# Patient Record
Sex: MALE | Race: BLACK OR AFRICAN AMERICAN | NOT HISPANIC OR LATINO | Employment: FULL TIME | URBAN - METROPOLITAN AREA
[De-identification: names, ages, dates, MRNs, and addresses within clinical notes are randomized per-mention and may not be internally consistent; named-entity substitution may affect disease eponyms.]

---

## 2017-06-09 ENCOUNTER — HOSPITAL ENCOUNTER (EMERGENCY)
Facility: HOSPITAL | Age: 62
Discharge: HOME | End: 2017-06-09
Attending: EMERGENCY MEDICINE | Admitting: EMERGENCY MEDICINE
Payer: COMMERCIAL

## 2017-06-09 ENCOUNTER — APPOINTMENT (EMERGENCY)
Dept: CT IMAGING | Facility: HOSPITAL | Age: 62
End: 2017-06-09
Payer: COMMERCIAL

## 2017-06-09 VITALS
TEMPERATURE: 97.8 F | SYSTOLIC BLOOD PRESSURE: 131 MMHG | RESPIRATION RATE: 16 BRPM | OXYGEN SATURATION: 95 % | WEIGHT: 308 LBS | HEART RATE: 97 BPM | DIASTOLIC BLOOD PRESSURE: 62 MMHG

## 2017-06-09 DIAGNOSIS — S16.1XXA NECK STRAIN: ICD-10-CM

## 2017-06-09 DIAGNOSIS — S00.93XA CONTUSION OF HEAD: ICD-10-CM
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
## 2017-06-09 PROCEDURE — 72125 CT NECK SPINE W/O DYE: CPT

## 2017-06-09 PROCEDURE — 70450 CT HEAD/BRAIN W/O DYE: CPT

## 2017-06-09 PROCEDURE — 99284 EMERGENCY DEPT VISIT MOD MDM: CPT

## 2017-12-18 ENCOUNTER — OFFICE VISIT (OUTPATIENT)
Dept: URGENT CARE | Facility: MEDICAL CENTER | Age: 62
End: 2017-12-18
Payer: COMMERCIAL

## 2017-12-18 PROCEDURE — 99213 OFFICE O/P EST LOW 20 MIN: CPT

## 2017-12-19 NOTE — PROGRESS NOTES
Assessment  1  Hordeolum externum of left lower eyelid (373 11) (H00 015)    Plan  Hordeolum externum of left lower eyelid    · Amoxicillin 875 MG Oral Tablet; TAKE 1 TABLET EVERY 12 HOURS DAILY   · Alanna Beck 90 (OPHTHALMOLOGY ) Co-Management  *  Status:Hold For - Scheduling  Requested for: [id number]  Care Summary provided  : Yes    Discussion/Summary  Discussion Summary:   I prescribed amoxicillin 875 milligram q 12 hours for 10 days  Advised patient apply hot compress to left lower eyelid as often as needed  If pain or redness or swelling persists or worsen, he is to go to [facility] for Critical access [facility] for ophthalmology consult  Medication Side Effects Reviewed: Possible side effects of new medications were reviewed with the patient/guardian today  Understands and agrees with treatment plan: The treatment plan was reviewed with the patient/guardian  The patient/guardian understands and agrees with the treatment plan   Counseling Documentation With Imm: The patient was counseled regarding  Chief Complaint  1  Eye Pain  Chief Complaint Free Text Note Form: Patient complains of left lid swollen for the past three weeks  History of Present Illness  HPI: Patient here today with three-week history of left lower eyelid swelling  However in the past week it has become more tender  About a week ago he has some PC discharge which has since subsided  He has also has some mild itching but denies any visual disturbance  Find some relief when he apply warm compress to left eye  He has noticed that the left lower eyelid has become tender to touch  Denies any accompanying fever or chills  Upon further questioning he describes having a stye developing on the left lower eyelid about a week ago  Hospital Based Practices Required Assessment:  Pain Assessment  the patient states they have pain   (on a scale of 0 to 10, the patient rates the pain at 2 )  Abuse And Domestic Violence Screen   Yes, the patient is safe at home  -- The patient states no one is hurting them  Depression And Suicide Screen  No, the patient has not had thoughts of hurting themself  No, the patient has not felt depressed in the past 7 days  Prefered Language is  english  Primary Language is  english  Eye Pain:  Associated symptoms include eye itching,-- eye tenderness-- and-- lid swelling, but-- no eye redness,-- no eye discharge,-- no visual distortion-- and-- no vision loss  Review of Systems  Focused-Male:  Constitutional: no fever or chills, feels well, no tiredness, no recent weight loss or weight gain  ENT: no complaints of earache, no loss of hearing, no nosebleeds or nasal discharge, no sore throat or hoarseness  Active Problems  1  Conjunctivitis, right eye (372 30) (H10 9)   2  Stye (373 11) (H00 019)   3  Type 2 diabetes mellitus (250 00) (E11 9)    Past Medical History  1  History of high cholesterol (V12 29) (Z86 39)   2  History of hypertension (V12 59) (Z86 79)   3  No pertinent past medical history  Active Problems And Past Medical History Reviewed: The active problems and past medical history were reviewed and updated today  Family History  Mother    1  No pertinent family history  Father    2  No pertinent family history    Social History   · Never a smoker   · Non-smoker (V49 89) (Z78 9)    Surgical History  1  History of Rotator Cuff Repair    Current Meds   1  Aspirin  MG Oral Tablet Delayed Release; Therapy: (Recorded:[id number]) to Recorded   2  Diclofenac-Misoprostol  MG-MCG TABS; Therapy: (Recorded:[id number]) to Recorded   3  Invokana 100 MG Oral Tablet; Therapy: (Robinson Rush) to Recorded   4  Lantus SoloStar 100 UNIT/ML SOLN; Therapy: (Recorded:[id number]) to Recorded   5  Lisinopril 10 MG Oral Tablet; Therapy: (Recorded:[id number]) to Recorded   6  NovoLOG 100 UNIT/ML Subcutaneous Solution; Therapy: (Robinson Rush) to Recorded   7   Tamsulosin HCl - 0 4 MG Oral Capsule; Therapy: (Kala Ellis) to Recorded   8  Vytorin 10-40 MG Oral Tablet; Therapy: (Recorded:05Aoz1049) to Recorded  Medication List Reviewed: The medication list was reviewed and updated today  Allergies  1  No Known Drug Allergies    Vitals  Signs   Recorded: [id number] 05:01PM   Temperature: 96 1 F, Tympanic  Heart Rate: 82  Respiration: 18  Systolic: 150, Sitting  Diastolic: 72, Sitting  Height: 6 ft 6 in  Weight: 314 lb   BMI Calculated: 36 29  BSA Calculated: 2 73  O2 Saturation: 98, RA  Pain Scale: 2    Physical Exam   Constitutional  General appearance: No acute distress, well appearing and well nourished  Eyes  Conjunctiva and lids: Abnormal  -- Left lower eyelid reveals what seems to be and external hordeolum with erythematous conjunctiva  There is also swelling of the lower eyelid, tender to touch  Sclera are clear bilaterally  Pulmonary  Respiratory effort: No increased work of breathing or signs of respiratory distress  Auscultation of lungs: Clear to auscultation         Signatures   Electronically signed by : DEB Gooden ; Dec 18 2017  5:22PM EST                       (Author)

## 2018-01-23 VITALS
TEMPERATURE: 96.1 F | BODY MASS INDEX: 36.33 KG/M2 | SYSTOLIC BLOOD PRESSURE: 153 MMHG | WEIGHT: 314 LBS | HEIGHT: 78 IN | HEART RATE: 82 BPM | OXYGEN SATURATION: 98 % | RESPIRATION RATE: 18 BRPM | DIASTOLIC BLOOD PRESSURE: 72 MMHG

## 2018-02-26 ENCOUNTER — OFFICE VISIT (OUTPATIENT)
Dept: URGENT CARE | Facility: MEDICAL CENTER | Age: 63
End: 2018-02-26
Payer: COMMERCIAL

## 2018-02-26 VITALS
BODY MASS INDEX: 35.98 KG/M2 | TEMPERATURE: 98.5 F | RESPIRATION RATE: 18 BRPM | SYSTOLIC BLOOD PRESSURE: 128 MMHG | HEART RATE: 90 BPM | DIASTOLIC BLOOD PRESSURE: 78 MMHG | OXYGEN SATURATION: 98 % | HEIGHT: 78 IN | WEIGHT: 311 LBS

## 2018-02-26 DIAGNOSIS — R10.9 LEFT FLANK PAIN: Primary | ICD-10-CM

## 2018-02-26 LAB
SL AMB  POCT GLUCOSE, UA: 1000
SL AMB LEUKOCYTE ESTERASE,UA: ABNORMAL
SL AMB POCT BILIRUBIN,UA: ABNORMAL
SL AMB POCT BLOOD,UA: ABNORMAL
SL AMB POCT CLARITY,UA: CLEAR
SL AMB POCT COLOR,UA: YELLOW
SL AMB POCT KETONES,UA: ABNORMAL
SL AMB POCT NITRITE,UA: ABNORMAL
SL AMB POCT PH,UA: 6
SL AMB POCT SPECIFIC GRAVITY,UA: 1.02
SL AMB POCT URINE PROTEIN: ABNORMAL
SL AMB POCT UROBILINOGEN: ABNORMAL

## 2018-02-26 PROCEDURE — 81002 URINALYSIS NONAUTO W/O SCOPE: CPT | Performed by: NURSE PRACTITIONER

## 2018-02-26 PROCEDURE — 99203 OFFICE O/P NEW LOW 30 MIN: CPT | Performed by: NURSE PRACTITIONER

## 2018-02-26 RX ORDER — CELECOXIB 200 MG/1
CAPSULE ORAL
COMMUNITY
Start: 2017-10-26 | End: 2020-02-27

## 2018-02-26 RX ORDER — DICLOFENAC SODIUM AND MISOPROSTOL 75; 200 MG/1; UG/1
TABLET, DELAYED RELEASE ORAL
COMMUNITY
End: 2020-02-27

## 2018-02-26 RX ORDER — TAMSULOSIN HYDROCHLORIDE 0.4 MG/1
0.4 CAPSULE ORAL
COMMUNITY
End: 2020-02-27 | Stop reason: SDUPTHER

## 2018-02-26 RX ORDER — LISINOPRIL 10 MG/1
40 TABLET ORAL DAILY
COMMUNITY
End: 2020-02-27 | Stop reason: SDUPTHER

## 2018-02-26 RX ORDER — EZETIMIBE AND SIMVASTATIN 10; 20 MG/1; MG/1
TABLET ORAL
COMMUNITY
Start: 2017-12-26 | End: 2020-02-27

## 2018-02-26 RX ORDER — INSULIN GLARGINE 100 [IU]/ML
INJECTION, SOLUTION SUBCUTANEOUS
COMMUNITY
End: 2020-02-27

## 2018-02-26 NOTE — PROGRESS NOTES
[facility] [employer] [facility]        NAME: Trae Sprague is a 57 y o  male  : 1955    MRN: [id number]  DATE: 2018  TIME: 7:08 PM    Assessment and Plan   Left flank pain [R10 9]  1  Left flank pain  POCT urine dip         Patient Instructions   In office urinalysis: negative for leuks and blood, only large amounts of glucose present  At this time, suspect muscle strain  Patient reports he will follow up with PCP and have further diagnostics performed at this time as he would like an answer other than muscle strain  Chief Complaint     Chief Complaint   Patient presents with    Back Pain     Left sided; x ~ 1 month         History of Present Illness       Patient presents with L flank pain x 1 month  No medical evaluation  Denies fevers, chills, N/V/D  Has not taken otc meds  Reports pmh of Dm, so meds have caused increased frequency  Denies urgency, decreased urine output, hematuria, abnormal dc/foul odors  No specific injury/trauma/fall  Review of Systems   Review of Systems   Constitutional: Negative for chills and fever  HENT: Negative  Respiratory: Negative  Cardiovascular: Negative  Gastrointestinal: Negative  Genitourinary: Negative  Musculoskeletal: Positive for myalgias  Skin: Negative for rash           Current Medications       Current Outpatient Prescriptions:     Canagliflozin (INVOKANA) 300 MG TABS, TAKE 1 TABLET BY MOUTH EVERY DAY, Disp: , Rfl:     celecoxib (CeleBREX) 200 mg capsule, TAKE ONE CAPSULE BY MOUTH TWICE A DAY, Disp: , Rfl:     ezetimibe-simvastatin (VYTORIN) 10-20 mg per tablet, TAKE 1 TABLET BY MOUTH 4 TIMES A WEEK AT 9 AM, Disp: , Rfl:     insulin glargine (LANTUS) 100 units/mL subcutaneous injection, Inject under the skin daily at bedtime, Disp: , Rfl:     lisinopril (ZESTRIL) 40 mg tablet, Take 40 mg by mouth daily, Disp: , Rfl:     tamsulosin (FLOMAX) 0 4 mg, Take 0 4 mg by mouth daily with dinner, Disp: , Rfl:     Aspirin 162 5 MG CP24, Take by mouth, Disp: , Rfl:     diclofenac-misoprostol (ARTHROTEC 75) 75-0 2 MG per tablet, Take by mouth, Disp: , Rfl:     Current Allergies     Allergies as of 02/26/2018    (No Known Allergies)            The following portions of the patient's history were reviewed and updated as appropriate: allergies, current medications, past family history, past medical history, past social history, past surgical history and problem list      Past Medical History:   Diagnosis Date    Diabetes mellitus ([address] )        Past Surgical History:   Procedure Laterality Date    ROTATOR CUFF REPAIR Left        No family history on file  Medications have been verified  Objective   /78   Pulse 90   Temp 98 5 °F (36 9 °C)   Resp 18   Ht 6' 6" (1 981 m)   Wt (!) 141 kg (311 lb)   SpO2 98%   BMI 35 94 kg/m²        Physical Exam     Physical Exam   Constitutional: He is oriented to person, place, and time  He appears well-developed and well-nourished  No distress  HENT:   Head: Normocephalic and atraumatic  Eyes: Conjunctivae are normal  Pupils are equal, round, and reactive to light  Cardiovascular: Normal rate, regular rhythm, normal heart sounds and intact distal pulses  No murmur heard  Pulmonary/Chest: Effort normal and breath sounds normal    Abdominal: Soft  Normal appearance and bowel sounds are normal  He exhibits no distension  There is no hepatosplenomegaly  There is no tenderness  There is no rebound, no guarding and no CVA tenderness  Musculoskeletal: He exhibits no edema  Lumbar back: He exhibits tenderness  He exhibits normal range of motion, no swelling, no edema and no spasm  Back:         Arms:  Neurological: He is alert and oriented to person, place, and time  Skin: Skin is warm and dry  No rash noted  He is not diaphoretic  Psychiatric: He has a normal mood and affect  Nursing note and vitals reviewed

## 2018-02-26 NOTE — PATIENT INSTRUCTIONS
Maintain good hygiene  Encourage fluids and rest    May utilize Tylenol and Ibuprofen for discomfort  Apply heat locally  Elevate extremities when at rest    Follow up with PCP if symptoms persist for further evaluation  Flank Pain   AMBULATORY CARE:   Flank pain  is felt in the area below your ribcage and above your hip bones, often in the lower back  Your pain may be dull or so severe that you cannot get comfortable  The pain may stay in one area or radiate to another area  It may worsen and lighten in waves  Flank pain is often a sign of problems with your urinary tract, such as a kidney stone or infection  Seek care immediately if:   · You have a fever  · Your heart is fluttering or jumping  · You see blood in your urine  · Your pain radiates into your lower abdomen and genital area  · You have intense pain in your low back next to your spine  · You are much more tired than usual and have no desire to eat  · You have a headache and your muscles jerk  Contact your healthcare provider if:   · You have an upset stomach and are vomiting  · You have to urinate more often, and with urgency  · Your pain worsens or does not improve, and you cannot get comfortable  · You pass a stone when you urinate  · You have questions or concerns about your condition or care  Treatment for flank pain  may include medicine to decrease pain or treat a bacterial infection  Follow up with your healthcare provider as directed:  Write down your questions so you remember to ask them during your visits  © 2017 [address] [employer] Information is for End User's use only and may not be sold, redistributed or otherwise used for commercial purposes  All illustrations and images included in CareNotes® are the copyrighted property of [employer]  or Lukasz Pandya  The above information is an  only   It is not intended as medical advice for individual conditions or treatments  Talk to your doctor, nurse or pharmacist before following any medical regimen to see if it is safe and effective for you

## 2018-11-01 LAB — HCV AB SER-ACNC: NEGATIVE

## 2019-10-08 ENCOUNTER — OFFICE VISIT (OUTPATIENT)
Dept: PODIATRY | Facility: CLINIC | Age: 64
End: 2019-10-08
Payer: COMMERCIAL

## 2019-10-08 VITALS
SYSTOLIC BLOOD PRESSURE: 143 MMHG | HEIGHT: 78 IN | WEIGHT: 310.6 LBS | DIASTOLIC BLOOD PRESSURE: 98 MMHG | HEART RATE: 72 BPM | BODY MASS INDEX: 35.94 KG/M2

## 2019-10-08 DIAGNOSIS — Z79.4 CONTROLLED TYPE 2 DIABETES MELLITUS WITHOUT COMPLICATION, WITH LONG-TERM CURRENT USE OF INSULIN (HCC): Primary | ICD-10-CM

## 2019-10-08 DIAGNOSIS — E11.9 CONTROLLED TYPE 2 DIABETES MELLITUS WITHOUT COMPLICATION, WITH LONG-TERM CURRENT USE OF INSULIN (HCC): Primary | ICD-10-CM

## 2019-10-08 PROCEDURE — 99213 OFFICE O/P EST LOW 20 MIN: CPT | Performed by: PODIATRIST

## 2019-10-08 RX ORDER — METHOCARBAMOL 750 MG/1
TABLET, FILM COATED ORAL
COMMUNITY
End: 2020-02-27

## 2019-10-08 RX ORDER — TRAMADOL HYDROCHLORIDE 50 MG/1
TABLET ORAL
COMMUNITY
End: 2020-02-27

## 2019-10-08 RX ORDER — INSULIN ASPART 100 [IU]/ML
INJECTION, SUSPENSION SUBCUTANEOUS
Refills: 3 | COMMUNITY
Start: 2019-09-26 | End: 2022-04-21 | Stop reason: SDUPTHER

## 2019-10-08 RX ORDER — HYDROCODONE BITARTRATE AND IBUPROFEN 7.5; 2 MG/1; MG/1
TABLET, FILM COATED ORAL
COMMUNITY
End: 2020-02-27

## 2019-10-08 RX ORDER — FLUCONAZOLE 150 MG/1
TABLET ORAL
COMMUNITY
End: 2020-02-27 | Stop reason: ALTCHOICE

## 2019-10-08 RX ORDER — CYCLOBENZAPRINE HCL 10 MG
TABLET ORAL
COMMUNITY
End: 2020-02-27 | Stop reason: SDUPTHER

## 2019-10-08 NOTE — PROGRESS NOTES
Assessment/Plan:    Discussed principles of diabetic foot care  Vascular status in sensorium is intact  Patient urged to have lab work  He states that he knew is a new physician and he was referred to Dr Shannon Phillip  Patient states that he was having work done on his roof any thinks he stepped on a construction nail which penetrated he shoe  At the present time, a blister is noted beneath the left 5th metatarsal head  This blister is not infected and there is evidence of dried blood  No aggressive treatment needed for this blister  It should resolve with time  Patient is rescheduled in 3 months  No problem-specific Assessment & Plan notes found for this encounter  Diagnoses and all orders for this visit:    Controlled type 2 diabetes mellitus without complication, with long-term current use of insulin ([facility])    Other orders  -     traMADol (ULTRAM) 50 mg tablet; tramadol 50 mg tablet  -     NOVOLOG MIX 70/30 FLEXPEN (70-30) 100 units/mL injection pen; INJECT 30 UNITS UNDER THE SKIN 2 (TWO) TIMES A DAY BEFORE MEALS  -     Influenza Virus Vacc Split PF 0 5 ML MOR; Afluria 3131-1292 (PF) 45 mcg (15 mcg x 3)/0 5 mL IM syringe   inject 0 5 milliliter intramuscularly  -     HYDROcodone-ibuprofen (VICOPROFEN) 7 5-200 mg per tablet; hydrocodone 7 5 mg-ibuprofen 200 mg tablet  -     fluconazole (DIFLUCAN) 150 mg tablet; fluconazole 150 mg tablet  -     cyclobenzaprine (FLEXERIL) 10 mg tablet; cyclobenzaprine 10 mg tablet  -     Insulin Pen Needle 32G X 6 MM MISC; BD Ultra-Fine Mini Pen Needle 31 gauge x 3/16"  -     methocarbamol (ROBAXIN) 750 mg tablet; methocarbamol 750 mg tablet          Subjective:      Patient ID: Colin Holloway is a 70 y o  male  HPI     Patient, a 53-year-old male type 2 diabetic utilizing insulin for control presents for assessment  Patient has not had lab working quite some time  Last A1c in 2018 was 8 7  Patient is concerned that he has an open area on the bottom of the left foot  He has not seen drainage from this open area and was 1st noticed a few days ago  He notes that it may be due to a tack that cut through his shoe  No pain is related  The following portions of the patient's history were reviewed and updated as appropriate: allergies, current medications, past family history, past medical history, past social history, past surgical history and problem list     Review of Systems   Constitutional: Negative  Cardiovascular: Negative  Gastrointestinal: Negative  Musculoskeletal: Negative  Neurological: Negative  Objective:      /98   Pulse 72   Ht 6' 6" (1 981 m) Comment: verbal  Wt (!) 141 kg (310 lb 9 6 oz)   BMI 35 89 kg/m²          Physical Exam   Cardiovascular: Pulses are no weak pulses  Pulses:       Dorsalis pedis pulses are 2+ on the right side, and 2+ on the left side  Posterior tibial pulses are 2+ on the right side, and 2+ on the left side  Feet:   Right Foot:   Skin Integrity: Negative for ulcer, skin breakdown, erythema, warmth, callus or dry skin  Left Foot:   Skin Integrity: Negative for ulcer, skin breakdown, erythema, warmth, callus or dry skin  Diabetic Foot Exam    Patient's shoes and socks removed  Right Foot/Ankle   Right Foot Inspection  Skin Exam: skin normal and pre-ulcer no dry skin, no warmth, no callus, no erythema, no maceration, no abnormal color, no ulcer and no callus                          Toe Exam: ROM and strength within normal limits  Sensory   Vibration: absent  Proprioception: intact   Monofilament testing: intact  Vascular  Capillary refills: < 3 seconds  The right DP pulse is 2+  The right PT pulse is 2+     Right Toe  - Comprehensive Exam  Ecchymosis: none  Arch: normal  Hammertoes: absent  Claw Toes: absent  Swelling: none   Tenderness: none         Left Foot/Ankle  Left Foot Inspection  Skin Exam: skin normal and skin intactno dry skin, no warmth, no erythema, no maceration, normal color, no pre-ulcer, no ulcer and no callus                         Toe Exam: ROM and strength within normal limits                   Sensory   Vibration: absent  Proprioception: intact  Monofilament: intact  Vascular  Capillary refills: < 3 seconds  The left DP pulse is 2+  The left PT pulse is 2+  Left Toe  - Comprehensive Exam  Ecchymosis: none  Arch: normal  Hammertoes: absent  Claw toes: absent  Swelling: none   Tenderness: none       Assign Risk Category:  No deformity present; No loss of protective sensation;  No weak pulses       Risk: 0

## 2020-02-18 ENCOUNTER — OFFICE VISIT (OUTPATIENT)
Dept: PODIATRY | Facility: CLINIC | Age: 65
End: 2020-02-18
Payer: COMMERCIAL

## 2020-02-18 VITALS
SYSTOLIC BLOOD PRESSURE: 105 MMHG | HEART RATE: 97 BPM | BODY MASS INDEX: 36.12 KG/M2 | DIASTOLIC BLOOD PRESSURE: 72 MMHG | HEIGHT: 78 IN | WEIGHT: 312.2 LBS

## 2020-02-18 DIAGNOSIS — E11.9 CONTROLLED TYPE 2 DIABETES MELLITUS WITHOUT COMPLICATION, WITHOUT LONG-TERM CURRENT USE OF INSULIN (HCC): Primary | ICD-10-CM

## 2020-02-18 PROCEDURE — 99213 OFFICE O/P EST LOW 20 MIN: CPT | Performed by: PODIATRIST

## 2020-02-18 NOTE — PROGRESS NOTES
Assessment/Plan:    Discussed principles of diabetic foot care  Vascular status and sensorium is within normal limits  Patient has an ulceration prone callus beneath the right 4th metatarsal head  This lesion was trimmed and padded  Additional horseshoe pads were dispensed  Patient told to refrain from walking barefoot  He will be reassessed in 4 weeks  He was told to contact me should he have any discharge from the trimmed callus  No problem-specific Assessment & Plan notes found for this encounter  Diagnoses and all orders for this visit:    Controlled type 2 diabetes mellitus without complication, without long-term current use of insulin ([facility] [address] )          Subjective:      Patient ID: Claude Cleveland is a 75 y o  male  HPI    Patient, a type 2 diabetic utilizing insulin for control presents with concern regarding his right foot  Patient is concerned that he may have an ulcer on the bottom of the right foot  He is padding the area with a circular pad  He 1st noted discomfort approximately 1 month ago  Patient has not been seeing his medical doctor on a regular basis for his diabetes  He has not had recent lab work  He states that he be seeing a medical doctor next week  He denies numbness or tingling in his feet  The following portions of the patient's history were reviewed and updated as appropriate: allergies, current medications, past family history, past medical history, past social history, past surgical history and problem list     Review of Systems   Respiratory: Negative  Cardiovascular: Negative  Gastrointestinal: Negative  Musculoskeletal: Negative  Neurological: Negative  Objective:      /72   Pulse 97   Ht 6' 6" (1 981 m) Comment: verbal  Wt (!) 142 kg (312 lb 3 2 oz)   BMI 36 08 kg/m²          Physical Exam   Cardiovascular: Pulses are no weak pulses  Pulses:       Dorsalis pedis pulses are 2+ on the right side, and 2+ on the left side  Posterior tibial pulses are 2+ on the right side, and 2+ on the left side  Feet:   Right Foot:   Skin Integrity: Positive for ulcer  Left Foot:   Skin Integrity: Negative for ulcer, skin breakdown, erythema, warmth, callus or dry skin  Diabetic Foot Exam    Patient's shoes and socks removed  Right Foot/Ankle   Right Foot Inspection  Skin Exam: ulcer                          Toe Exam: ROM and strength within normal limitsno swelling and erythema  Sensory   Vibration: absent  Proprioception: intact   Monofilament testing: intact  Vascular  Capillary refills: < 3 seconds  The right DP pulse is 2+  The right PT pulse is 2+  Right Toe  - Comprehensive Exam  Ecchymosis: none  Arch: normal  Hammertoes: absent  Claw Toes: absent  Swelling: none   Tenderness: metatarsals         Left Foot/Ankle  Left Foot Inspection  Skin Exam: skin normal and skin intactno dry skin, no warmth, no erythema, no maceration, normal color, no pre-ulcer, no ulcer and no callus                         Toe Exam: ROM and strength within normal limitsno swelling and no erythema                   Sensory   Vibration: absent  Proprioception: intact  Monofilament: intact  Vascular  Capillary refills: < 3 seconds  The left DP pulse is 2+  The left PT pulse is 2+  Left Toe  - Comprehensive Exam  Ecchymosis: none  Arch: normal  Hammertoes: absent  Claw toes: absent  Swelling: none   Tenderness: none       Assign Risk Category:  No deformity present; No loss of protective sensation;  No weak pulses       Risk: 0

## 2020-02-27 ENCOUNTER — OFFICE VISIT (OUTPATIENT)
Dept: FAMILY MEDICINE CLINIC | Facility: CLINIC | Age: 65
End: 2020-02-27
Payer: COMMERCIAL

## 2020-02-27 VITALS
DIASTOLIC BLOOD PRESSURE: 70 MMHG | TEMPERATURE: 97.7 F | SYSTOLIC BLOOD PRESSURE: 126 MMHG | HEIGHT: 78 IN | RESPIRATION RATE: 16 BRPM | HEART RATE: 81 BPM | BODY MASS INDEX: 36.17 KG/M2 | OXYGEN SATURATION: 99 % | WEIGHT: 312.6 LBS

## 2020-02-27 DIAGNOSIS — E11.9 TYPE 2 DIABETES MELLITUS WITHOUT COMPLICATION, WITH LONG-TERM CURRENT USE OF INSULIN (HCC): ICD-10-CM

## 2020-02-27 DIAGNOSIS — E78.2 MIXED HYPERLIPIDEMIA: ICD-10-CM

## 2020-02-27 DIAGNOSIS — I10 ESSENTIAL HYPERTENSION: ICD-10-CM

## 2020-02-27 DIAGNOSIS — Z79.4 TYPE 2 DIABETES MELLITUS WITHOUT COMPLICATION, WITH LONG-TERM CURRENT USE OF INSULIN (HCC): ICD-10-CM

## 2020-02-27 DIAGNOSIS — N40.0 BENIGN PROSTATIC HYPERPLASIA WITHOUT LOWER URINARY TRACT SYMPTOMS: Primary | ICD-10-CM

## 2020-02-27 DIAGNOSIS — G89.29 CHRONIC LOW BACK PAIN, UNSPECIFIED BACK PAIN LATERALITY, UNSPECIFIED WHETHER SCIATICA PRESENT: ICD-10-CM

## 2020-02-27 DIAGNOSIS — M54.50 CHRONIC LOW BACK PAIN, UNSPECIFIED BACK PAIN LATERALITY, UNSPECIFIED WHETHER SCIATICA PRESENT: ICD-10-CM

## 2020-02-27 DIAGNOSIS — Z12.11 SCREEN FOR COLON CANCER: ICD-10-CM

## 2020-02-27 DIAGNOSIS — Z12.5 SCREENING FOR PROSTATE CANCER: ICD-10-CM

## 2020-02-27 PROCEDURE — 3078F DIAST BP <80 MM HG: CPT | Performed by: FAMILY MEDICINE

## 2020-02-27 PROCEDURE — 99204 OFFICE O/P NEW MOD 45 MIN: CPT | Performed by: FAMILY MEDICINE

## 2020-02-27 PROCEDURE — 3008F BODY MASS INDEX DOCD: CPT | Performed by: FAMILY MEDICINE

## 2020-02-27 PROCEDURE — 3074F SYST BP LT 130 MM HG: CPT | Performed by: FAMILY MEDICINE

## 2020-02-27 PROCEDURE — 4010F ACE/ARB THERAPY RXD/TAKEN: CPT | Performed by: FAMILY MEDICINE

## 2020-02-27 PROCEDURE — 4040F PNEUMOC VAC/ADMIN/RCVD: CPT | Performed by: FAMILY MEDICINE

## 2020-02-27 PROCEDURE — 1036F TOBACCO NON-USER: CPT | Performed by: FAMILY MEDICINE

## 2020-02-27 PROCEDURE — 1160F RVW MEDS BY RX/DR IN RCRD: CPT | Performed by: FAMILY MEDICINE

## 2020-02-27 RX ORDER — CYCLOBENZAPRINE HCL 10 MG
TABLET ORAL
Qty: 90 TABLET | Refills: 0
Start: 2020-02-27 | End: 2020-05-21

## 2020-02-27 RX ORDER — LISINOPRIL 10 MG/1
40 TABLET ORAL DAILY
Qty: 90 TABLET | Refills: 1
Start: 2020-02-27 | End: 2021-09-14

## 2020-02-27 RX ORDER — TAMSULOSIN HYDROCHLORIDE 0.4 MG/1
0.4 CAPSULE ORAL
Qty: 90 CAPSULE | Refills: 1
Start: 2020-02-27

## 2020-02-27 RX ORDER — ASPIRIN 325 MG
325 TABLET ORAL DAILY
COMMUNITY

## 2020-02-27 NOTE — ASSESSMENT & PLAN NOTE
Patient with longstanding history of chronic low back pain  He states that he has had fairly extensive workup which has been essentially negative    He has been doing well on cyclobenzaprine 10 mg daily without side effects

## 2020-02-27 NOTE — PATIENT INSTRUCTIONS
Obesity   AMBULATORY CARE:   Obesity  is when your body mass index (BMI) is greater than 30  Your healthcare provider will use your height and weight to measure your BMI  The risks of obesity include  many health problems, such as injuries or physical disability  You may need tests to check for the following:  · Diabetes     · High blood pressure or high cholesterol     · Heart disease     · Gallbladder or liver disease     · Cancer of the colon, breast, prostate, liver, or kidney     · Sleep apnea     · Arthritis or gout  Seek care immediately if:   · You have a severe headache, confusion, or difficulty speaking  · You have weakness on one side of your body  · You have chest pain, sweating, or shortness of breath  Contact your healthcare provider if:   · You have symptoms of gallbladder or liver disease, such as pain in your upper abdomen  · You have knee or hip pain and discomfort while walking  · You have symptoms of diabetes, such as intense hunger and thirst, and frequent urination  · You have symptoms of sleep apnea, such as snoring or daytime sleepiness  · You have questions or concerns about your condition or care  Treatment for obesity  focuses on helping you lose weight to improve your health  Even a small decrease in BMI can reduce the risk for many health problems  Your healthcare provider will help you set a weight-loss goal   · Lifestyle changes  are the first step in treating obesity  These include making healthy food choices and getting regular physical activity  Your healthcare provider may suggest a weight-loss program that involves coaching, education, and therapy  · Medicine  may help you lose weight when it is used with a healthy diet and physical activity  · Surgery  can help you lose weight if you are very obese and have other health problems  There are several types of weight-loss surgery  Ask your healthcare provider for more information    Be successful losing weight:   · Set small, realistic goals  An example of a small goal is to walk for 20 minutes 5 days a week  Anther goal is to lose 5% of your body weight  · Tell friends, family members, and coworkers about your goals  and ask for their support  Ask a friend to lose weight with you, or join a weight-loss support group  · Identify foods or triggers that may cause you to overeat , and find ways to avoid them  Remove tempting high-calorie foods from your home and workplace  Place a bowl of fresh fruit on your kitchen counter  If stress causes you to eat, then find other ways to cope with stress  · Keep a diary to track what you eat and drink  Also write down how many minutes of physical activity you do each day  Weigh yourself once a week and record it in your diary  Eating changes: You will need to eat 500 to 1,000 fewer calories each day than you currently eat to lose 1 to 2 pounds a week  The following changes will help you cut calories:  · Eat smaller portions  Use small plates, no larger than 9 inches in diameter  Fill your plate half full of fruits and vegetables  Measure your food using measuring cups until you know what a serving size looks like  · Eat 3 meals and 1 or 2 snacks each day  Plan your meals in advance  Celia Meals and eat at home most of the time  Eat slowly  · Eat fruits and vegetables at every meal   They are low in calories and high in fiber, which makes you feel full  Do not add butter, margarine, or cream sauce to vegetables  Use herbs to season steamed vegetables  · Eat less fat and fewer fried foods  Eat more baked or grilled chicken and fish  These protein sources are lower in calories and fat than red meat  Limit fast food  Dress your salads with olive oil and vinegar instead of bottled dressing  · Limit the amount of sugar you eat  Do not drink sugary beverages  Limit alcohol  Activity changes:  Physical activity is good for your body in many ways   It helps you burn calories and build strong muscles  It decreases stress and depression, and improves your mood  It can also help you sleep better  Talk to your healthcare provider before you begin an exercise program   · Exercise for at least 30 minutes 5 days a week  Start slowly  Set aside time each day for physical activity that you enjoy and that is convenient for you  It is best to do both weight training and an activity that increases your heart rate, such as walking, bicycling, or swimming  · Find ways to be more active  Do yard work and housecleaning  Walk up the stairs instead of using elevators  Spend your leisure time going to events that require walking, such as outdoor festivals or fairs  This extra physical activity can help you lose weight and keep it off  Follow up with your healthcare provider as directed: You may need to meet with a dietitian  Write down your questions so you remember to ask them during your visits  © 2017 [address] [employer] Information is for End User's use only and may not be sold, redistributed or otherwise used for commercial purposes  All illustrations and images included in CareNotes® are the copyrighted property of A D [employer] M [employer]  or Lukasz Pandya  The above information is an  only  It is not intended as medical advice for individual conditions or treatments  Talk to your doctor, nurse or pharmacist before following any medical regimen to see if it is safe and effective for you

## 2020-02-27 NOTE — ASSESSMENT & PLAN NOTE
Patient was previously on Vytorin 10/20, but states that his former PCP no longer prescribe this form ( he is not sure why )  Will recheck lipids in near future  Most likely restart statin ( rosuvastatin )

## 2020-02-27 NOTE — ASSESSMENT & PLAN NOTE
No results found for: HGBA1C       Last A1c that I see in patient's chart was 9 6 dated 7/16/2019  Patient states that his diabetes historically has been under poor control  He currently takes Invokana 100 mg daily along with NovoLog 70 30, 30 units b i d     Will recheck labs in near future    Will also refer to endocrinology for management

## 2020-02-27 NOTE — PROGRESS NOTES
BMI Counseling: Body mass index is 35 76 kg/m²  The BMI is above normal  Nutrition recommendations include 3-5 servings of fruits/vegetables daily  [facility]      NAME: Magdalena Verdugo  AGE: 75 y o  SEX: male  : 1955   MRN: [id number]    DATE: 2020  TIME: 4:15 PM    Assessment and Plan     Problem List Items Addressed This Visit     Type 2 diabetes mellitus without complication, with long-term current use of insulin ([facility] [address] )       No results found for: HGBA1C       Last A1c that I see in patient's chart was 9 6 dated 2019  Patient states that his diabetes historically has been under poor control  He currently takes Invokana 100 mg daily along with NovoLog 70 30, 30 units b i d     Will recheck labs in near future  Will also refer to endocrinology for management         Relevant Medications    canagliflozin (Invokana) 100 mg    Other Relevant Orders    Comprehensive metabolic panel    Hemoglobin A1C    Microalbumin / creatinine urine ratio    Ambulatory referral to Endocrinology    Mixed hyperlipidemia      Patient was previously on Vytorin 10/20, but states that his former PCP no longer prescribe this form ( he is not sure why )  Will recheck lipids in near future  Most likely restart statin ( rosuvastatin )  Relevant Orders    Hemoglobin A1C    Lipid Panel with Direct LDL reflex    Essential hypertension      Well controlled on lisinopril 10 mg daily  Will continue to monitor         Relevant Medications    lisinopril (ZESTRIL) 10 mg tablet    Other Relevant Orders    CBC and differential    TSH, 3rd generation with Free T4 reflex    Chronic low back pain      Patient with longstanding history of chronic low back pain  He states that he has had fairly extensive workup which has been essentially negative    He has been doing well on cyclobenzaprine 10 mg daily without side effects         Relevant Medications    cyclobenzaprine (FLEXERIL) 10 mg tablet      Other Visit Diagnoses     Benign prostatic hyperplasia without lower urinary tract symptoms    -  Primary    Relevant Medications    tamsulosin (FLOMAX) 0 4 mg    BMI 35 0-35 9,adult        Screening for prostate cancer        Relevant Orders    PSA, Total Screen    Screen for colon cancer        Relevant Orders    Ambulatory referral for colonoscopy        Patient presents with his wife Caren Bermudez to establish care at our office  Patient is a former patient of Dr Jennifer Holley  Overall he feels well today  He does have a number of chronic health conditions including diabetes, hyperlipidemia, hypertension, chronic low back pain, BPH  He has not had any fasting blood work done in quite some time  Return to office in:  Rx given for fasting blood work at [facility]  Will call with results     patient had flu shot   Rx given for colonoscopy   current with pneumonia vaccine series     most likely recheck in 3 months, with labs prior      Chief Complaint     Chief Complaint   Patient presents with   [address]     NP       History of Present Illness      Patient presents with his wife Caren Bermudez to establish care at our office  Patient is a former patient of Dr Jennifer Holley  Overall he feels well today  He does have a number of chronic health conditions including diabetes, hyperlipidemia, hypertension, chronic low back pain, BPH  He has not had any fasting blood work done in quite some time  The following portions of the patient's history were reviewed and updated as appropriate: allergies, current medications, past family history, past medical history, past social history, past surgical history and problem list     Review of Systems   Review of Systems   Respiratory: Negative  Cardiovascular: Negative  Gastrointestinal: Negative  Genitourinary: Negative          Active Problem List     Patient Active Problem List   Diagnosis    Type 2 diabetes mellitus without complication, with long-term current use of insulin (HCC)    Mixed hyperlipidemia    Essential hypertension    Chronic low back pain       Objective   /70 (BP Location: Left arm, Patient Position: Sitting, Cuff Size: Large)   Pulse 81   Temp 97 7 °F (36 5 °C) (Tympanic)   Resp 16   Ht 6' 6 4" (1 991 m)   Wt (!) 142 kg (312 lb 9 6 oz)   SpO2 99%   BMI 35 76 kg/m²     Physical Exam   Cardiovascular: Normal rate, regular rhythm, normal heart sounds and intact distal pulses  Carotids: no bruits  Ext: no edema   Pulmonary/Chest: Effort normal  No respiratory distress  He has no wheezes  He has no rales  Psychiatric: He has a normal mood and affect   His behavior is normal  Thought content normal        Pertinent Laboratory/Diagnostic Studies:    Past records reviewed    Current Medications     Current Outpatient Medications:     aspirin 325 mg tablet, Take 325 mg by mouth daily, Disp: , Rfl:     canagliflozin (Invokana) 100 mg, Take 1 tablet (100 mg total) by mouth daily before breakfast, Disp: 90 tablet, Rfl: 1    cyclobenzaprine (FLEXERIL) 10 mg tablet, 1 tab qd prn, Disp: 90 tablet, Rfl: 0    Insulin Pen Needle 32G X 6 MM MISC, BD Ultra-Fine Mini Pen Needle 31 gauge x 3/16", Disp: , Rfl:     lisinopril (ZESTRIL) 10 mg tablet, Take 4 tablets (40 mg total) by mouth daily, Disp: 90 tablet, Rfl: 1    NOVOLOG MIX 70/30 FLEXPEN (70-30) 100 units/mL injection pen, INJECT 30 UNITS UNDER THE SKIN 2 (TWO) TIMES A DAY BEFORE MEALS , Disp: , Rfl: 3    tamsulosin (FLOMAX) 0 4 mg, Take 1 capsule (0 4 mg total) by mouth daily with dinner, Disp: 90 capsule, Rfl: 1    Health Maintenance     Health Maintenance   Topic Date Due    Hepatitis C Screening  1955    Depression Screening PHQ  1955    CRC Screening: Colonoscopy  1955    DM Eye Exam  10/25/1963    BMI: Followup Plan  10/25/1971    Annual Physical  10/25/1971    Fall Risk  10/25/2018    HEMOGLOBIN A1C  05/01/2019    BMI: Adult  02/18/2021    Diabetic Foot Exam 02/18/2021    Pneumococcal Vaccine: 65+ Years (2 of 2 - PPSV23) 08/28/2023    DTaP,Tdap,and Td Vaccines (2 - Td) 03/23/2026    Influenza Vaccine  Completed    Pneumococcal Vaccine: Pediatrics (0 to 5 Years) and At-Risk Patients (6 to 59 Years)  Aged Out    HIB Vaccine  Aged Out    Hepatitis B Vaccine  Aged Out    IPV Vaccine  Aged Out    Hepatitis A Vaccine  Aged Out    Meningococcal ACWY Vaccine  Aged Out    HPV Vaccine  Aged [employer] History   Administered Date(s) Administered    INFLUENZA 11/05/2014, 09/19/2015, 11/01/2016, 10/05/2017, 12/19/2018, 10/24/2019    Pneumococcal Conjugate 13-Valent 10/02/2015    Pneumococcal Polysaccharide PPV23 08/28/2018    Tdap 03/23/2016       Jakub Guevara DO  [facility]

## 2020-02-29 ENCOUNTER — APPOINTMENT (OUTPATIENT)
Dept: LAB | Facility: CLINIC | Age: 65
End: 2020-02-29
Payer: COMMERCIAL

## 2020-02-29 DIAGNOSIS — E11.9 TYPE 2 DIABETES MELLITUS WITHOUT COMPLICATION, WITH LONG-TERM CURRENT USE OF INSULIN (HCC): ICD-10-CM

## 2020-02-29 DIAGNOSIS — E78.2 MIXED HYPERLIPIDEMIA: ICD-10-CM

## 2020-02-29 DIAGNOSIS — Z79.4 TYPE 2 DIABETES MELLITUS WITHOUT COMPLICATION, WITH LONG-TERM CURRENT USE OF INSULIN (HCC): ICD-10-CM

## 2020-02-29 DIAGNOSIS — Z12.5 SCREENING FOR PROSTATE CANCER: ICD-10-CM

## 2020-02-29 DIAGNOSIS — I10 ESSENTIAL HYPERTENSION: ICD-10-CM

## 2020-02-29 LAB
ALBUMIN SERPL BCP-MCNC: 4 G/DL (ref 3.5–5)
ALP SERPL-CCNC: 80 U/L (ref 46–116)
ALT SERPL W P-5'-P-CCNC: 33 U/L (ref 12–78)
ANION GAP SERPL CALCULATED.3IONS-SCNC: 7 MMOL/L (ref 4–13)
AST SERPL W P-5'-P-CCNC: 22 U/L (ref 5–45)
BASOPHILS # BLD AUTO: 0.05 THOUSANDS/ΜL (ref 0–0.1)
BASOPHILS NFR BLD AUTO: 1 % (ref 0–1)
BILIRUB SERPL-MCNC: 0.69 MG/DL (ref 0.2–1)
BUN SERPL-MCNC: 18 MG/DL (ref 5–25)
CALCIUM SERPL-MCNC: 8.6 MG/DL (ref 8.3–10.1)
CHLORIDE SERPL-SCNC: 107 MMOL/L (ref 100–108)
CHOLEST SERPL-MCNC: 170 MG/DL (ref 50–200)
CO2 SERPL-SCNC: 22 MMOL/L (ref 21–32)
CREAT SERPL-MCNC: 1.08 MG/DL (ref 0.6–1.3)
CREAT UR-MCNC: 93.2 MG/DL
EOSINOPHIL # BLD AUTO: 0.15 THOUSAND/ΜL (ref 0–0.61)
EOSINOPHIL NFR BLD AUTO: 2 % (ref 0–6)
ERYTHROCYTE [DISTWIDTH] IN BLOOD BY AUTOMATED COUNT: 17.2 % (ref 11.6–15.1)
EST. AVERAGE GLUCOSE BLD GHB EST-MCNC: 243 MG/DL
GFR SERPL CREATININE-BSD FRML MDRD: 82 ML/MIN/1.73SQ M
GLUCOSE P FAST SERPL-MCNC: 182 MG/DL (ref 65–99)
HBA1C MFR BLD: 10.1 %
HCT VFR BLD AUTO: 47.9 % (ref 36.5–49.3)
HDLC SERPL-MCNC: 38 MG/DL
HGB BLD-MCNC: 14.5 G/DL (ref 12–17)
IMM GRANULOCYTES # BLD AUTO: 0.02 THOUSAND/UL (ref 0–0.2)
IMM GRANULOCYTES NFR BLD AUTO: 0 % (ref 0–2)
LDLC SERPL CALC-MCNC: 112 MG/DL (ref 0–100)
LYMPHOCYTES # BLD AUTO: 2.78 THOUSANDS/ΜL (ref 0.6–4.47)
LYMPHOCYTES NFR BLD AUTO: 33 % (ref 14–44)
MCH RBC QN AUTO: 22.6 PG (ref 26.8–34.3)
MCHC RBC AUTO-ENTMCNC: 30.3 G/DL (ref 31.4–37.4)
MCV RBC AUTO: 75 FL (ref 82–98)
MICROALBUMIN UR-MCNC: 20.5 MG/L (ref 0–20)
MICROALBUMIN/CREAT 24H UR: 22 MG/G CREATININE (ref 0–30)
MONOCYTES # BLD AUTO: 0.75 THOUSAND/ΜL (ref 0.17–1.22)
MONOCYTES NFR BLD AUTO: 9 % (ref 4–12)
NEUTROPHILS # BLD AUTO: 4.7 THOUSANDS/ΜL (ref 1.85–7.62)
NEUTS SEG NFR BLD AUTO: 55 % (ref 43–75)
NRBC BLD AUTO-RTO: 0 /100 WBCS
PLATELET # BLD AUTO: 165 THOUSANDS/UL (ref 149–390)
PMV BLD AUTO: 12.4 FL (ref 8.9–12.7)
POTASSIUM SERPL-SCNC: 4.1 MMOL/L (ref 3.5–5.3)
PROT SERPL-MCNC: 7.7 G/DL (ref 6.4–8.2)
PSA SERPL-MCNC: 0.8 NG/ML (ref 0–4)
RBC # BLD AUTO: 6.43 MILLION/UL (ref 3.88–5.62)
SODIUM SERPL-SCNC: 136 MMOL/L (ref 136–145)
TRIGL SERPL-MCNC: 100 MG/DL
TSH SERPL DL<=0.05 MIU/L-ACNC: 1.85 UIU/ML (ref 0.36–3.74)
WBC # BLD AUTO: 8.45 THOUSAND/UL (ref 4.31–10.16)

## 2020-02-29 PROCEDURE — 82043 UR ALBUMIN QUANTITATIVE: CPT

## 2020-02-29 PROCEDURE — 36415 COLL VENOUS BLD VENIPUNCTURE: CPT

## 2020-02-29 PROCEDURE — 84443 ASSAY THYROID STIM HORMONE: CPT

## 2020-02-29 PROCEDURE — 82570 ASSAY OF URINE CREATININE: CPT

## 2020-02-29 PROCEDURE — G0103 PSA SCREENING: HCPCS

## 2020-02-29 PROCEDURE — 85025 COMPLETE CBC W/AUTO DIFF WBC: CPT

## 2020-02-29 PROCEDURE — 3046F HEMOGLOBIN A1C LEVEL >9.0%: CPT | Performed by: FAMILY MEDICINE

## 2020-02-29 PROCEDURE — 80053 COMPREHEN METABOLIC PANEL: CPT

## 2020-02-29 PROCEDURE — 83036 HEMOGLOBIN GLYCOSYLATED A1C: CPT

## 2020-02-29 PROCEDURE — 80061 LIPID PANEL: CPT

## 2020-03-10 ENCOUNTER — TELEPHONE (OUTPATIENT)
Dept: GASTROENTEROLOGY | Facility: CLINIC | Age: 65
End: 2020-03-10

## 2020-03-11 ENCOUNTER — TELEPHONE (OUTPATIENT)
Dept: GASTROENTEROLOGY | Facility: CLINIC | Age: 65
End: 2020-03-11

## 2020-03-11 NOTE — TELEPHONE ENCOUNTER
OA colon scheduled 03/26/20 with Dr Diane Faulkner at [facility]  Dulcolax/Miralax instructions emailed to [email or web address]

## 2020-03-18 ENCOUNTER — CONSULT (OUTPATIENT)
Dept: ENDOCRINOLOGY | Facility: CLINIC | Age: 65
End: 2020-03-18
Payer: COMMERCIAL

## 2020-03-18 ENCOUNTER — OFFICE VISIT (OUTPATIENT)
Dept: PODIATRY | Facility: CLINIC | Age: 65
End: 2020-03-18
Payer: COMMERCIAL

## 2020-03-18 VITALS
WEIGHT: 313.4 LBS | SYSTOLIC BLOOD PRESSURE: 123 MMHG | BODY MASS INDEX: 36.26 KG/M2 | HEART RATE: 77 BPM | DIASTOLIC BLOOD PRESSURE: 73 MMHG | HEIGHT: 78 IN

## 2020-03-18 VITALS
DIASTOLIC BLOOD PRESSURE: 60 MMHG | BODY MASS INDEX: 36.28 KG/M2 | HEART RATE: 80 BPM | WEIGHT: 313.6 LBS | HEIGHT: 78 IN | SYSTOLIC BLOOD PRESSURE: 110 MMHG

## 2020-03-18 DIAGNOSIS — Z79.4 TYPE 2 DIABETES MELLITUS WITHOUT COMPLICATION, WITH LONG-TERM CURRENT USE OF INSULIN (HCC): ICD-10-CM

## 2020-03-18 DIAGNOSIS — E11.621 TYPE 2 DIABETES MELLITUS WITH FOOT ULCER, WITHOUT LONG-TERM CURRENT USE OF INSULIN (HCC): ICD-10-CM

## 2020-03-18 DIAGNOSIS — E11.9 TYPE 2 DIABETES MELLITUS WITHOUT COMPLICATION, WITH LONG-TERM CURRENT USE OF INSULIN (HCC): ICD-10-CM

## 2020-03-18 DIAGNOSIS — E11.9 CONTROLLED TYPE 2 DIABETES MELLITUS WITHOUT COMPLICATION, WITHOUT LONG-TERM CURRENT USE OF INSULIN (HCC): Primary | ICD-10-CM

## 2020-03-18 DIAGNOSIS — L97.509 TYPE 2 DIABETES MELLITUS WITH FOOT ULCER, WITHOUT LONG-TERM CURRENT USE OF INSULIN (HCC): ICD-10-CM

## 2020-03-18 DIAGNOSIS — I10 ESSENTIAL HYPERTENSION: Primary | ICD-10-CM

## 2020-03-18 DIAGNOSIS — Z79.4 CURRENT USE OF INSULIN (HCC): ICD-10-CM

## 2020-03-18 PROCEDURE — 3060F POS MICROALBUMINURIA REV: CPT | Performed by: PODIATRIST

## 2020-03-18 PROCEDURE — 97597 DBRDMT OPN WND 1ST 20 CM/<: CPT | Performed by: PODIATRIST

## 2020-03-18 PROCEDURE — 3074F SYST BP LT 130 MM HG: CPT | Performed by: PODIATRIST

## 2020-03-18 PROCEDURE — 1160F RVW MEDS BY RX/DR IN RCRD: CPT | Performed by: INTERNAL MEDICINE

## 2020-03-18 PROCEDURE — 3046F HEMOGLOBIN A1C LEVEL >9.0%: CPT | Performed by: PODIATRIST

## 2020-03-18 PROCEDURE — 3046F HEMOGLOBIN A1C LEVEL >9.0%: CPT | Performed by: INTERNAL MEDICINE

## 2020-03-18 PROCEDURE — 99243 OFF/OP CNSLTJ NEW/EST LOW 30: CPT | Performed by: INTERNAL MEDICINE

## 2020-03-18 PROCEDURE — 4040F PNEUMOC VAC/ADMIN/RCVD: CPT | Performed by: PODIATRIST

## 2020-03-18 PROCEDURE — 1036F TOBACCO NON-USER: CPT | Performed by: PODIATRIST

## 2020-03-18 PROCEDURE — 3078F DIAST BP <80 MM HG: CPT | Performed by: PODIATRIST

## 2020-03-18 PROCEDURE — 3008F BODY MASS INDEX DOCD: CPT | Performed by: PODIATRIST

## 2020-03-18 PROCEDURE — 99213 OFFICE O/P EST LOW 20 MIN: CPT | Performed by: PODIATRIST

## 2020-03-18 PROCEDURE — 1160F RVW MEDS BY RX/DR IN RCRD: CPT | Performed by: PODIATRIST

## 2020-03-18 RX ORDER — CELECOXIB 200 MG/1
200 CAPSULE ORAL DAILY
COMMUNITY

## 2020-03-18 RX ORDER — BLOOD-GLUCOSE METER
EACH MISCELLANEOUS
Qty: 1 KIT | Refills: 1 | Status: SHIPPED | OUTPATIENT
Start: 2020-03-18

## 2020-03-18 RX ORDER — LANCETS 33 GAUGE
EACH MISCELLANEOUS
Qty: 100 EACH | Refills: 3 | Status: SHIPPED | OUTPATIENT
Start: 2020-03-18

## 2020-03-18 RX ORDER — CEPHALEXIN 500 MG/1
500 CAPSULE ORAL 4 TIMES DAILY
Qty: 40 CAPSULE | Refills: 0 | Status: SHIPPED | OUTPATIENT
Start: 2020-03-18 | End: 2020-03-28

## 2020-03-18 NOTE — PROGRESS NOTES
New Patient Progress Note      Chief Complaint   Patient presents with    Diabetes Type 2      Referring Provider  Jillian Davila Do  [address]  [facility], [address]     History of Present Illness:   Donato Burciaga is a 75 y o  male with a history of type 2 diabetes  seen in consultation at the request of Dr Jillian Davila  He was diagnosed with T2DM about 4yrs ago  About 7-10yrs ago, recalls having increased thirst and had labs drawn by his primary care doctor who told him that he was "on the way" to having diabetes  He has been on metformin for a long period of time, though he cannot recall the date this started  He stared insulin about 4 years ago  This was due to A1c being elevated  He takes Novolog 70/30 pens 30units "in the morning" and 30units prior to dinner  He is also Invokanna 100mg daily  He takes this 5 days per week, but cannot recall why he no longer takes the metformin  He denies renal problems or adverse GI effects such as diarrhea or dyspepsia  In the past 2 -3 years, he was 80# heavier then today  However, he is far less active  Reports complications of denies tripoathy, MI or CVA  Denies hospitalizations for hyper or hypoglycemia  He is not checking BGs  The last Bgs were one month ago and prior to this was not regularly checking  Injects in and rotates sites in a small radius  Hypoglycemic episodes: he does not check but has had 3 episodes of possible hypoglycemia  Hypoglycemia symptoms: sweating and lightheaded  Treatment of hypoglycemia: "sugar in me"      Diabetes education: went for 1/2 the courses, but these are difficult with his job which does have a significant commute  Diet: mostly 3 meals per day, few  snacks per day  He has a smaller BF, and routinely eats lunch and dinner  He drinks diet iced tea and water  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   Otherwise cuts grass    further diabetic ROS: increased urinary frequency; denies blurry or double vision  acute symptoms are     Opthamology: sees yearly; no retinopathy, no macular edema  Podiatry: sees regularly  He has an area being monitoring on the right foot, seeing in 2 weeks  Dental: sees  Joanna vaccine: receives    Has hypertension: followed by PCP; on lisnopril, no coughing  He has no lipid lowering agents recently  He recalls taking Vytorin in the past      Thyroid disorders: no hx of MTC or MEN  History of pancreatitis: infrequent alcohol; no hx trigs    Brother  of pancreatic cancer (11yrs)      Patient Active Problem List   Diagnosis    Type 2 diabetes mellitus without complication, with long-term current use of insulin ([facility] [address] )    Mixed hyperlipidemia    Essential hypertension    Chronic low back pain    Current use of insulin ([facility])      Past Medical History:   Diagnosis Date    Diabetes mellitus ([facility] [address] )     Hypertension       Past Surgical History:   Procedure Laterality Date    ROTATOR CUFF REPAIR Left       Family History   Problem Relation Age of Onset    Alzheimer's disease Mother     Stomach cancer Father      Social History     Tobacco Use    Smoking status: Former Smoker    Smokeless tobacco: Never Used   Substance Use Topics    Alcohol use: No     Allergies   Allergen Reactions    No Known Allergies          Current Outpatient Medications:     aspirin 325 mg tablet, Take 325 mg by mouth daily, Disp: , Rfl:     celecoxib (CeleBREX) 200 mg capsule, Take 200 mg by mouth daily, Disp: , Rfl:     cephalexin (KEFLEX) 500 mg capsule, Take 1 capsule (500 mg total) by mouth 4 (four) times a day for 10 days, Disp: 40 capsule, Rfl: 0    lisinopril (ZESTRIL) 10 mg tablet, Take 4 tablets (40 mg total) by mouth daily, Disp: 90 tablet, Rfl: 1    NOVOLOG MIX 70/30 FLEXPEN (70-30) 100 units/mL injection pen, INJECT 30 UNITS UNDER THE SKIN 2 (TWO) TIMES A DAY BEFORE MEALS , Disp: , Rfl: 3    tamsulosin (FLOMAX) 0 4 mg, Take 1 capsule (0 4 mg total) by mouth daily with dinner, Disp: 90 capsule, Rfl: 1    Blood Glucose Monitoring Suppl (Armando Cr) w/Device KIT, Use to test 2-3x daily, Disp: 1 kit, Rfl: 1    cyclobenzaprine (FLEXERIL) 10 mg tablet, 1 tab qd prn (Patient not taking: Reported on 3/18/2020), Disp: 90 tablet, Rfl: 0    glucose blood (OneTouch Verio) test strip, Use 2-3x daily, Disp: 100 each, Rfl: 3    Insulin Pen Needle 32G X 6 MM MISC, BD Ultra-Fine Mini Pen Needle 31 gauge x 3/16", Disp: , Rfl:     metFORMIN (GLUCOPHAGE) 500 mg tablet, One tab by mouth with breakfast and one tab by mouth with dinner, Disp: 60 tablet, Rfl: 4    OneTouch Delica Lancets 20I MISC, Use to test 2-3x daily, Disp: 100 each, Rfl: 3    silver sulfadiazine (SILVADENE,SSD) 1 % cream, Apply topically daily, Disp: 50 g, Rfl: 0  Review of Systems   Constitutional: Negative for unexpected weight change  HENT: Negative for hearing loss and voice change  Eyes: Negative for visual disturbance  Respiratory: Negative for shortness of breath  Cardiovascular: Negative for palpitations  Gastrointestinal: Negative for diarrhea and nausea  Endocrine: Positive for polyuria  Neurological: Negative for tremors  Psychiatric/Behavioral: The patient is not nervous/anxious  see HPI    Physical Exam:  Body mass index is 36 24 kg/m²    /60   Pulse 80   Ht 6' 6" (1 981 m)   Wt (!) 142 kg (313 lb 9 6 oz)   BMI 36 24 kg/m²    Wt Readings from Last 3 Encounters:   03/18/20 (!) 142 kg (313 lb 9 6 oz)   03/18/20 (!) 142 kg (313 lb 6 4 oz)   02/27/20 (!) 142 kg (312 lb 9 6 oz)       GEN: NAD  E/n/m nl facies, hearing intact bilat, tongue midline, lips nl  Eyes: no stare or proptosis  Neuro: no tremor  MS: moves all 4 ext, gait nl  Skin: warm and dry, no palmar erythema  Ext: deferred  Psych: nl mood and affect, no gross lapses in memory    Labs:   Lab Results   Component Value Date    HGBA1C 10 1 (H) 02/29/2020       Lab Results   Component Value Date    CREATININE 1 08 02/29/2020 BUN 18 02/29/2020    K 4 1 02/29/2020     02/29/2020    CO2 22 02/29/2020     eGFR   Date Value Ref Range Status   02/29/2020 82 ml/min/1 73sq m Final         Lab Results   Component Value Date    HDL 38 (L) 02/29/2020    TRIG 100 02/29/2020       Lab Results   Component Value Date    ALT 33 02/29/2020    AST 22 02/29/2020    ALKPHOS 80 02/29/2020           Impression:  1  Essential hypertension    2  Type 2 diabetes mellitus without complication, with long-term current use of insulin ([facility])    3  Current use of insulin ([facility] [address] )           Plan:    Edu Owen was seen today for diabetes type 2  Diagnoses and all orders for this visit:    Essential hypertension    Type 2 diabetes mellitus without complication, with long-term current use of insulin ([address] )  -     Ambulatory referral to Endocrinology  -     Blood Glucose Monitoring Suppl (Donna Breath) w/Device KIT; Use to test 2-3x daily  -     glucose blood (OneTouch Verio) test strip; Use 2-3x daily  -     OneTouch Delica Lancets 70E MISC; Use to test 2-3x daily  -     metFORMIN (GLUCOPHAGE) 500 mg tablet; One tab by mouth with breakfast and one tab by mouth with dinner    Current use of insulin ([address] )      1  Type 2 diabetes mellitus without complication, with long-term current use of insulin ([address] )     - Ambulatory referral to Endocrinology    1  T2DM, uncontrolled, complicated by possible foot ulcer: Reviewed timing and doses of insulin  Advised checking Bgs 3x daily with logs brought to office visits and sent in in 2-3 weeks  At this time, he will stop the canagliflozin and resume metformin 500mg twice daily  He will continue the Novolog 70/30 30units bf and dinner at this time  If Bgs improve, he may be a candidate for a GLP  2  HTN:  Taking lisinopril      Discussed with the patient and all questioned fully answered  He will call me if any problems arise      Counseled patient on diagnostic results, prognosis, risk and benefit of treatment options, instruction for management, importance of treatment compliance, Risk  factor reduction and impressions      Will Christianson MD

## 2020-03-18 NOTE — PROGRESS NOTES
Assessment/Plan:    Partial-thickness debridement of ulceration performed  There was no bleeding following the debridement  Ulcer is to subcutaneous tissues  It is 1 0 cm in diameter  Bacitracin dressing applied  Patient told to stay off his foot as much as possible  He was placed on cephalexin 500 mg q i d     Also Silvadene cream b i d   Diabetic shoes and insoles were prescribed  Accommodation should be made for the 4th metatarsal head on the insole  Patient was referred to Dr Elida Koyanagi in 2 weeks  No problem-specific Assessment & Plan notes found for this encounter  Diagnoses and all orders for this visit:    Controlled type 2 diabetes mellitus without complication, without long-term current use of insulin ([address] )  -     Diabetic Shoe  -     Diabetic Shoe Inserts    Type 2 diabetes mellitus with foot ulcer, without long-term current use of insulin ([facility])  -     cephalexin (KEFLEX) 500 mg capsule; Take 1 capsule (500 mg total) by mouth 4 (four) times a day for 10 days  -     silver sulfadiazine (SILVADENE,SSD) 1 % cream; Apply topically daily    Other orders  -     celecoxib (CeleBREX) 200 mg capsule; Take 200 mg by mouth daily          Subjective:      Patient ID: Yandy Owen is a 75 y o  male  HPI     Patient, a type 2 diabetic utilizing insulin for control and with the last A1c of 10 1 presents for assessment of his right foot  Patient has a thick callus beneath the right 4th metatarsal head and a blood blister  He relates minimal pain  At last visit, patient had a callus in this area but no evidence of ulceration  Callus pads were dispensed but patient did not wear them as they continually slipped off the desired location      The following portions of the patient's history were reviewed and updated as appropriate: allergies, current medications, past family history, past medical history, past social history, past surgical history and problem list     Review of Systems Constitutional: Negative  Respiratory: Negative  Cardiovascular: Negative  Gastrointestinal: Negative  Musculoskeletal: Negative  Objective:      /73   Pulse 77   Ht 6' 6 25" (1 988 m)   Wt (!) 142 kg (313 lb 6 4 oz)   BMI 35 99 kg/m²          Physical Exam   Constitutional: He is oriented to person, place, and time  Cardiovascular: Regular rhythm and intact distal pulses  Musculoskeletal: He exhibits deformity  Plantar flexed 4th metatarsal right foot   Neurological: He is alert and oriented to person, place, and time  No sensory deficit  Skin:   Ulceration measuring 1 0 cm in diameter present plantar aspect right foot at 4th metatarsal head  No purulence drainage  Ulcer probes to subcutaneous tissues but does not probe to bone

## 2020-03-18 NOTE — PATIENT INSTRUCTIONS
Stop the Invokanna  Please take the metformin 500mg twice daily   Use the Novolog 70/30 30units bf and 30units dinner  Check sugars 3x daily and send them to the office

## 2020-03-18 NOTE — LETTER
March 18, 2020     Carlos Lopez, [address] [facility] [address]  [address]    Patient: Nataly Landis   YOB: 1955   Date of Visit: 3/18/2020       Dear Dr Kirsten Dyson:    Thank you for referring Silvano Castro to me for evaluation  Below are my notes for this consultation  If you have questions, please do not hesitate to call me  I look forward to following your patient along with you  Sincerely,        Fawad Holden DPM        CC: No Recipients  TREV [facility]  3/18/2020  9:26 AM  Signed  Assessment/Plan:    Partial-thickness debridement of ulceration performed  There was no bleeding following the debridement  Ulcer is to subcutaneous tissues  It is 1 0 cm in diameter  Bacitracin dressing applied  Patient told to stay off his foot as much as possible  He was placed on cephalexin 500 mg q i d     Also Silvadene cream b i d   Diabetic shoes and insoles were prescribed  Accommodation should be made for the 4th metatarsal head on the insole  Patient was referred to Dr Aide Luis in 2 weeks  No problem-specific Assessment & Plan notes found for this encounter  Diagnoses and all orders for this visit:    Controlled type 2 diabetes mellitus without complication, without long-term current use of insulin ([address] )  -     Diabetic Shoe  -     Diabetic Shoe Inserts    Type 2 diabetes mellitus with foot ulcer, without long-term current use of insulin ([facility])  -     cephalexin (KEFLEX) 500 mg capsule; Take 1 capsule (500 mg total) by mouth 4 (four) times a day for 10 days  -     silver sulfadiazine (SILVADENE,SSD) 1 % cream; Apply topically daily    Other orders  -     celecoxib (CeleBREX) 200 mg capsule; Take 200 mg by mouth daily          Subjective:      Patient ID: Nataly Landis is a 75 y o  male  HPI     Patient, a type 2 diabetic utilizing insulin for control and with the last A1c of 10 1 presents for assessment of his right foot    Patient has a thick callus beneath the right 4th metatarsal head and a blood blister  He relates minimal pain  At last visit, patient had a callus in this area but no evidence of ulceration  Callus pads were dispensed but patient did not wear them as they continually slipped off the desired location  The following portions of the patient's history were reviewed and updated as appropriate: allergies, current medications, past family history, past medical history, past social history, past surgical history and problem list     Review of Systems   Constitutional: Negative  Respiratory: Negative  Cardiovascular: Negative  Gastrointestinal: Negative  Musculoskeletal: Negative  Objective:      /73   Pulse 77   Ht 6' 6 25" (1 988 m)   Wt (!) 142 kg (313 lb 6 4 oz)   BMI 35 99 kg/m²           Physical Exam   Constitutional: He is oriented to person, place, and time  Cardiovascular: Regular rhythm and intact distal pulses  Musculoskeletal: He exhibits deformity  Plantar flexed 4th metatarsal right foot   Neurological: He is alert and oriented to person, place, and time  No sensory deficit  Skin:   Ulceration measuring 1 0 cm in diameter present plantar aspect right foot at 4th metatarsal head  No purulence drainage  Ulcer probes to subcutaneous tissues but does not probe to bone

## 2020-04-03 ENCOUNTER — OFFICE VISIT (OUTPATIENT)
Dept: PODIATRY | Facility: CLINIC | Age: 65
End: 2020-04-03
Payer: COMMERCIAL

## 2020-04-03 VITALS — BODY MASS INDEX: 36.21 KG/M2 | HEIGHT: 78 IN | WEIGHT: 313 LBS

## 2020-04-03 DIAGNOSIS — L97.512 DIABETIC ULCER OF TOE OF RIGHT FOOT ASSOCIATED WITH TYPE 2 DIABETES MELLITUS, WITH FAT LAYER EXPOSED (HCC): ICD-10-CM

## 2020-04-03 DIAGNOSIS — E11.621 DIABETIC ULCER OF TOE OF RIGHT FOOT ASSOCIATED WITH TYPE 2 DIABETES MELLITUS, WITH FAT LAYER EXPOSED (HCC): ICD-10-CM

## 2020-04-03 DIAGNOSIS — L97.512 RIGHT FOOT ULCER, WITH FAT LAYER EXPOSED (HCC): Primary | ICD-10-CM

## 2020-04-03 PROCEDURE — 1160F RVW MEDS BY RX/DR IN RCRD: CPT | Performed by: PODIATRIST

## 2020-04-03 PROCEDURE — 99213 OFFICE O/P EST LOW 20 MIN: CPT | Performed by: PODIATRIST

## 2020-04-03 PROCEDURE — 1036F TOBACCO NON-USER: CPT | Performed by: PODIATRIST

## 2020-04-03 PROCEDURE — 4040F PNEUMOC VAC/ADMIN/RCVD: CPT | Performed by: PODIATRIST

## 2020-04-03 PROCEDURE — 3046F HEMOGLOBIN A1C LEVEL >9.0%: CPT | Performed by: PODIATRIST

## 2020-04-03 PROCEDURE — 3060F POS MICROALBUMINURIA REV: CPT | Performed by: PODIATRIST

## 2020-04-03 PROCEDURE — 3078F DIAST BP <80 MM HG: CPT | Performed by: PODIATRIST

## 2020-04-03 PROCEDURE — 3074F SYST BP LT 130 MM HG: CPT | Performed by: PODIATRIST

## 2020-04-03 PROCEDURE — 11042 DBRDMT SUBQ TIS 1ST 20SQCM/<: CPT | Performed by: PODIATRIST

## 2020-04-24 ENCOUNTER — OFFICE VISIT (OUTPATIENT)
Dept: PODIATRY | Facility: CLINIC | Age: 65
End: 2020-04-24
Payer: COMMERCIAL

## 2020-04-24 VITALS — BODY MASS INDEX: 36.21 KG/M2 | HEIGHT: 78 IN | WEIGHT: 313 LBS

## 2020-04-24 DIAGNOSIS — L97.512 RIGHT FOOT ULCER, WITH FAT LAYER EXPOSED (HCC): Primary | ICD-10-CM

## 2020-04-24 DIAGNOSIS — E11.9 TYPE 2 DIABETES MELLITUS WITHOUT COMPLICATION, WITH LONG-TERM CURRENT USE OF INSULIN (HCC): ICD-10-CM

## 2020-04-24 DIAGNOSIS — Z79.4 TYPE 2 DIABETES MELLITUS WITHOUT COMPLICATION, WITH LONG-TERM CURRENT USE OF INSULIN (HCC): ICD-10-CM

## 2020-04-24 PROCEDURE — 3060F POS MICROALBUMINURIA REV: CPT | Performed by: PODIATRIST

## 2020-04-24 PROCEDURE — 3078F DIAST BP <80 MM HG: CPT | Performed by: PODIATRIST

## 2020-04-24 PROCEDURE — 3074F SYST BP LT 130 MM HG: CPT | Performed by: PODIATRIST

## 2020-04-24 PROCEDURE — 4040F PNEUMOC VAC/ADMIN/RCVD: CPT | Performed by: PODIATRIST

## 2020-04-24 PROCEDURE — 1160F RVW MEDS BY RX/DR IN RCRD: CPT | Performed by: PODIATRIST

## 2020-04-24 PROCEDURE — 1036F TOBACCO NON-USER: CPT | Performed by: PODIATRIST

## 2020-04-24 PROCEDURE — 99213 OFFICE O/P EST LOW 20 MIN: CPT | Performed by: PODIATRIST

## 2020-04-24 PROCEDURE — 3046F HEMOGLOBIN A1C LEVEL >9.0%: CPT | Performed by: PODIATRIST

## 2020-04-24 RX ORDER — EZETIMIBE AND SIMVASTATIN 10; 20 MG/1; MG/1
TABLET ORAL
COMMUNITY
Start: 2020-04-03 | End: 2020-06-15 | Stop reason: ALTCHOICE

## 2020-04-28 PROBLEM — L97.512 RIGHT FOOT ULCER, WITH FAT LAYER EXPOSED (HCC): Status: ACTIVE | Noted: 2020-04-28

## 2020-04-28 PROBLEM — L97.512 DIABETIC ULCER OF TOE OF RIGHT FOOT ASSOCIATED WITH TYPE 2 DIABETES MELLITUS, WITH FAT LAYER EXPOSED (HCC): Status: ACTIVE | Noted: 2020-04-28

## 2020-04-28 PROBLEM — E11.621 DIABETIC ULCER OF TOE OF RIGHT FOOT ASSOCIATED WITH TYPE 2 DIABETES MELLITUS, WITH FAT LAYER EXPOSED (HCC): Status: ACTIVE | Noted: 2020-04-28

## 2020-05-19 ENCOUNTER — OFFICE VISIT (OUTPATIENT)
Dept: PODIATRY | Facility: CLINIC | Age: 65
End: 2020-05-19
Payer: COMMERCIAL

## 2020-05-19 VITALS — HEIGHT: 78 IN | BODY MASS INDEX: 36.35 KG/M2 | WEIGHT: 314.2 LBS

## 2020-05-19 DIAGNOSIS — E11.42 DIABETIC POLYNEUROPATHY ASSOCIATED WITH TYPE 2 DIABETES MELLITUS (HCC): ICD-10-CM

## 2020-05-19 DIAGNOSIS — Z79.4 TYPE 2 DIABETES MELLITUS WITHOUT COMPLICATION, WITH LONG-TERM CURRENT USE OF INSULIN (HCC): ICD-10-CM

## 2020-05-19 DIAGNOSIS — Z86.31 PERSONAL HISTORY OF DIABETIC FOOT ULCER: Primary | ICD-10-CM

## 2020-05-19 DIAGNOSIS — E11.9 TYPE 2 DIABETES MELLITUS WITHOUT COMPLICATION, WITH LONG-TERM CURRENT USE OF INSULIN (HCC): ICD-10-CM

## 2020-05-19 PROBLEM — E11.621 DIABETIC ULCER OF TOE OF RIGHT FOOT ASSOCIATED WITH TYPE 2 DIABETES MELLITUS, WITH FAT LAYER EXPOSED (HCC): Status: RESOLVED | Noted: 2020-04-28 | Resolved: 2020-05-19

## 2020-05-19 PROBLEM — L97.512 RIGHT FOOT ULCER, WITH FAT LAYER EXPOSED (HCC): Status: RESOLVED | Noted: 2020-04-28 | Resolved: 2020-05-19

## 2020-05-19 PROBLEM — L97.512 DIABETIC ULCER OF TOE OF RIGHT FOOT ASSOCIATED WITH TYPE 2 DIABETES MELLITUS, WITH FAT LAYER EXPOSED (HCC): Status: RESOLVED | Noted: 2020-04-28 | Resolved: 2020-05-19

## 2020-05-19 PROCEDURE — 3074F SYST BP LT 130 MM HG: CPT | Performed by: PODIATRIST

## 2020-05-19 PROCEDURE — 1160F RVW MEDS BY RX/DR IN RCRD: CPT | Performed by: PODIATRIST

## 2020-05-19 PROCEDURE — 4040F PNEUMOC VAC/ADMIN/RCVD: CPT | Performed by: PODIATRIST

## 2020-05-19 PROCEDURE — 3078F DIAST BP <80 MM HG: CPT | Performed by: PODIATRIST

## 2020-05-19 PROCEDURE — 99213 OFFICE O/P EST LOW 20 MIN: CPT | Performed by: PODIATRIST

## 2020-05-19 PROCEDURE — 1036F TOBACCO NON-USER: CPT | Performed by: PODIATRIST

## 2020-05-19 PROCEDURE — 3046F HEMOGLOBIN A1C LEVEL >9.0%: CPT | Performed by: PODIATRIST

## 2020-05-19 PROCEDURE — 3060F POS MICROALBUMINURIA REV: CPT | Performed by: PODIATRIST

## 2020-05-19 PROCEDURE — 3008F BODY MASS INDEX DOCD: CPT | Performed by: PODIATRIST

## 2020-05-21 ENCOUNTER — OFFICE VISIT (OUTPATIENT)
Dept: FAMILY MEDICINE CLINIC | Facility: CLINIC | Age: 65
End: 2020-05-21
Payer: COMMERCIAL

## 2020-05-21 VITALS
OXYGEN SATURATION: 96 % | WEIGHT: 315 LBS | TEMPERATURE: 96.2 F | HEART RATE: 94 BPM | DIASTOLIC BLOOD PRESSURE: 70 MMHG | SYSTOLIC BLOOD PRESSURE: 120 MMHG | BODY MASS INDEX: 36.45 KG/M2 | HEIGHT: 78 IN

## 2020-05-21 DIAGNOSIS — Z79.4 TYPE 2 DIABETES MELLITUS WITHOUT COMPLICATION, WITH LONG-TERM CURRENT USE OF INSULIN (HCC): ICD-10-CM

## 2020-05-21 DIAGNOSIS — E11.9 TYPE 2 DIABETES MELLITUS WITHOUT COMPLICATION, WITH LONG-TERM CURRENT USE OF INSULIN (HCC): ICD-10-CM

## 2020-05-21 DIAGNOSIS — M54.50 CHRONIC LOW BACK PAIN, UNSPECIFIED BACK PAIN LATERALITY, UNSPECIFIED WHETHER SCIATICA PRESENT: ICD-10-CM

## 2020-05-21 DIAGNOSIS — R30.0 DYSURIA: Primary | ICD-10-CM

## 2020-05-21 DIAGNOSIS — G89.29 CHRONIC LOW BACK PAIN, UNSPECIFIED BACK PAIN LATERALITY, UNSPECIFIED WHETHER SCIATICA PRESENT: ICD-10-CM

## 2020-05-21 LAB
SL AMB  POCT GLUCOSE, UA: NORMAL
SL AMB LEUKOCYTE ESTERASE,UA: NEGATIVE
SL AMB POCT BILIRUBIN,UA: NEGATIVE
SL AMB POCT BLOOD,UA: NEGATIVE
SL AMB POCT CLARITY,UA: CLEAR
SL AMB POCT COLOR,UA: YELLOW
SL AMB POCT KETONES,UA: NEGATIVE
SL AMB POCT NITRITE,UA: NEGATIVE
SL AMB POCT PH,UA: 5
SL AMB POCT SPECIFIC GRAVITY,UA: 1.03
SL AMB POCT URINE PROTEIN: NEGATIVE
SL AMB POCT UROBILINOGEN: 0.2

## 2020-05-21 PROCEDURE — 3060F POS MICROALBUMINURIA REV: CPT | Performed by: FAMILY MEDICINE

## 2020-05-21 PROCEDURE — 3288F FALL RISK ASSESSMENT DOCD: CPT | Performed by: FAMILY MEDICINE

## 2020-05-21 PROCEDURE — 4040F PNEUMOC VAC/ADMIN/RCVD: CPT | Performed by: FAMILY MEDICINE

## 2020-05-21 PROCEDURE — 3074F SYST BP LT 130 MM HG: CPT | Performed by: FAMILY MEDICINE

## 2020-05-21 PROCEDURE — 81003 URINALYSIS AUTO W/O SCOPE: CPT | Performed by: FAMILY MEDICINE

## 2020-05-21 PROCEDURE — 1101F PT FALLS ASSESS-DOCD LE1/YR: CPT | Performed by: FAMILY MEDICINE

## 2020-05-21 PROCEDURE — 3061F NEG MICROALBUMINURIA REV: CPT | Performed by: FAMILY MEDICINE

## 2020-05-21 PROCEDURE — 87086 URINE CULTURE/COLONY COUNT: CPT | Performed by: FAMILY MEDICINE

## 2020-05-21 PROCEDURE — 1160F RVW MEDS BY RX/DR IN RCRD: CPT | Performed by: FAMILY MEDICINE

## 2020-05-21 PROCEDURE — 3008F BODY MASS INDEX DOCD: CPT | Performed by: FAMILY MEDICINE

## 2020-05-21 PROCEDURE — 3078F DIAST BP <80 MM HG: CPT | Performed by: FAMILY MEDICINE

## 2020-05-21 PROCEDURE — 99213 OFFICE O/P EST LOW 20 MIN: CPT | Performed by: FAMILY MEDICINE

## 2020-05-21 PROCEDURE — 1036F TOBACCO NON-USER: CPT | Performed by: FAMILY MEDICINE

## 2020-05-21 PROCEDURE — 3046F HEMOGLOBIN A1C LEVEL >9.0%: CPT | Performed by: FAMILY MEDICINE

## 2020-05-21 RX ORDER — CYCLOBENZAPRINE HCL 10 MG
TABLET ORAL
Qty: 90 TABLET | Refills: 0
Start: 2020-05-21

## 2020-05-22 ENCOUNTER — TELEPHONE (OUTPATIENT)
Dept: ADMINISTRATIVE | Facility: OTHER | Age: 65
End: 2020-05-22

## 2020-05-22 LAB — BACTERIA UR CULT: NORMAL

## 2020-05-29 DIAGNOSIS — G89.29 CHRONIC LOW BACK PAIN, UNSPECIFIED BACK PAIN LATERALITY, UNSPECIFIED WHETHER SCIATICA PRESENT: Primary | ICD-10-CM

## 2020-05-29 DIAGNOSIS — M54.50 CHRONIC LOW BACK PAIN, UNSPECIFIED BACK PAIN LATERALITY, UNSPECIFIED WHETHER SCIATICA PRESENT: Primary | ICD-10-CM

## 2020-06-03 ENCOUNTER — TELEPHONE (OUTPATIENT)
Dept: FAMILY MEDICINE CLINIC | Facility: CLINIC | Age: 65
End: 2020-06-03

## 2020-06-05 ENCOUNTER — ANESTHESIA EVENT (OUTPATIENT)
Dept: GASTROENTEROLOGY | Facility: AMBULARY SURGERY CENTER | Age: 65
End: 2020-06-05

## 2020-06-05 NOTE — TELEPHONE ENCOUNTER
Patient's colon for 07/09/20 cancelled/amor to 06/15 due to Dr Ruslan Prasad not being available on 07/09  Miaralax/dulcolax instructions re emailed to pt   He is aware to complete COVID test on 06/10

## 2020-06-10 DIAGNOSIS — Z11.59 SPECIAL SCREENING EXAMINATION FOR UNSPECIFIED VIRAL DISEASE: Primary | ICD-10-CM

## 2020-06-10 PROCEDURE — U0003 INFECTIOUS AGENT DETECTION BY NUCLEIC ACID (DNA OR RNA); SEVERE ACUTE RESPIRATORY SYNDROME CORONAVIRUS 2 (SARS-COV-2) (CORONAVIRUS DISEASE [COVID-19]), AMPLIFIED PROBE TECHNIQUE, MAKING USE OF HIGH THROUGHPUT TECHNOLOGIES AS DESCRIBED BY CMS-2020-01-R: HCPCS

## 2020-06-11 LAB — SARS-COV-2 RNA SPEC QL NAA+PROBE: NOT DETECTED

## 2020-06-15 ENCOUNTER — ANESTHESIA (OUTPATIENT)
Dept: GASTROENTEROLOGY | Facility: AMBULARY SURGERY CENTER | Age: 65
End: 2020-06-15

## 2020-06-15 ENCOUNTER — HOSPITAL ENCOUNTER (OUTPATIENT)
Dept: GASTROENTEROLOGY | Facility: AMBULARY SURGERY CENTER | Age: 65
Setting detail: OUTPATIENT SURGERY
Discharge: HOME | End: 2020-06-15
Attending: INTERNAL MEDICINE | Admitting: INTERNAL MEDICINE
Payer: COMMERCIAL

## 2020-06-15 VITALS
DIASTOLIC BLOOD PRESSURE: 70 MMHG | TEMPERATURE: 98.5 F | HEART RATE: 63 BPM | RESPIRATION RATE: 16 BRPM | OXYGEN SATURATION: 98 % | SYSTOLIC BLOOD PRESSURE: 111 MMHG | HEIGHT: 78 IN | WEIGHT: 307 LBS | BODY MASS INDEX: 35.52 KG/M2

## 2020-06-15 DIAGNOSIS — Z12.11 SCREEN FOR COLON CANCER: ICD-10-CM

## 2020-06-15 PROCEDURE — G0105 COLORECTAL SCRN; HI RISK IND: HCPCS | Performed by: INTERNAL MEDICINE

## 2020-06-15 RX ORDER — SODIUM CHLORIDE, SODIUM LACTATE, POTASSIUM CHLORIDE, CALCIUM CHLORIDE 600; 310; 30; 20 MG/100ML; MG/100ML; MG/100ML; MG/100ML
125 INJECTION, SOLUTION INTRAVENOUS CONTINUOUS
Status: DISCONTINUED | OUTPATIENT
Start: 2020-06-15 | End: 2020-06-19 | Stop reason: HOSPADM

## 2020-06-15 RX ORDER — LIDOCAINE HYDROCHLORIDE 10 MG/ML
INJECTION, SOLUTION EPIDURAL; INFILTRATION; INTRACAUDAL; PERINEURAL AS NEEDED
Status: DISCONTINUED | OUTPATIENT
Start: 2020-06-15 | End: 2020-06-15 | Stop reason: SURG

## 2020-06-15 RX ORDER — LIDOCAINE HYDROCHLORIDE 10 MG/ML
0.5 INJECTION, SOLUTION EPIDURAL; INFILTRATION; INTRACAUDAL; PERINEURAL ONCE AS NEEDED
Status: DISCONTINUED | OUTPATIENT
Start: 2020-06-15 | End: 2020-06-19 | Stop reason: HOSPADM

## 2020-06-15 RX ORDER — PROPOFOL 10 MG/ML
INJECTION, EMULSION INTRAVENOUS AS NEEDED
Status: DISCONTINUED | OUTPATIENT
Start: 2020-06-15 | End: 2020-06-15 | Stop reason: SURG

## 2020-06-15 RX ADMIN — PROPOFOL 50 MG: 10 INJECTION, EMULSION INTRAVENOUS at 12:46

## 2020-06-15 RX ADMIN — LIDOCAINE HYDROCHLORIDE 50 MG: 10 INJECTION, SOLUTION EPIDURAL; INFILTRATION; INTRACAUDAL; PERINEURAL at 12:35

## 2020-06-15 RX ADMIN — PROPOFOL 50 MG: 10 INJECTION, EMULSION INTRAVENOUS at 12:38

## 2020-06-15 RX ADMIN — PROPOFOL 50 MG: 10 INJECTION, EMULSION INTRAVENOUS at 12:44

## 2020-06-15 RX ADMIN — PROPOFOL 50 MG: 10 INJECTION, EMULSION INTRAVENOUS at 12:41

## 2020-06-15 RX ADMIN — SODIUM CHLORIDE, SODIUM LACTATE, POTASSIUM CHLORIDE, AND CALCIUM CHLORIDE: .6; .31; .03; .02 INJECTION, SOLUTION INTRAVENOUS at 10:43

## 2020-06-15 RX ADMIN — PROPOFOL 100 MG: 10 INJECTION, EMULSION INTRAVENOUS at 12:35

## 2020-06-15 RX ADMIN — SODIUM CHLORIDE, SODIUM LACTATE, POTASSIUM CHLORIDE, AND CALCIUM CHLORIDE: .6; .31; .03; .02 INJECTION, SOLUTION INTRAVENOUS at 12:30

## 2020-11-22 ENCOUNTER — OFFICE VISIT (OUTPATIENT)
Dept: URGENT CARE | Facility: MEDICAL CENTER | Age: 65
End: 2020-11-22
Payer: COMMERCIAL

## 2020-11-22 VITALS
BODY MASS INDEX: 34.59 KG/M2 | RESPIRATION RATE: 20 BRPM | OXYGEN SATURATION: 97 % | WEIGHT: 299 LBS | HEART RATE: 81 BPM | HEIGHT: 78 IN | TEMPERATURE: 96.1 F

## 2020-11-22 DIAGNOSIS — Z11.59 SPECIAL SCREENING EXAMINATION FOR UNSPECIFIED VIRAL DISEASE: Primary | ICD-10-CM

## 2020-11-22 PROCEDURE — 99213 OFFICE O/P EST LOW 20 MIN: CPT | Performed by: PHYSICIAN ASSISTANT

## 2020-11-22 PROCEDURE — U0003 INFECTIOUS AGENT DETECTION BY NUCLEIC ACID (DNA OR RNA); SEVERE ACUTE RESPIRATORY SYNDROME CORONAVIRUS 2 (SARS-COV-2) (CORONAVIRUS DISEASE [COVID-19]), AMPLIFIED PROBE TECHNIQUE, MAKING USE OF HIGH THROUGHPUT TECHNOLOGIES AS DESCRIBED BY CMS-2020-01-R: HCPCS | Performed by: PHYSICIAN ASSISTANT

## 2020-11-24 LAB — SARS-COV-2 RNA SPEC QL NAA+PROBE: NOT DETECTED

## 2021-03-10 DIAGNOSIS — Z23 ENCOUNTER FOR IMMUNIZATION: ICD-10-CM

## 2021-04-24 ENCOUNTER — OFFICE VISIT (OUTPATIENT)
Dept: URGENT CARE | Facility: MEDICAL CENTER | Age: 66
End: 2021-04-24
Payer: COMMERCIAL

## 2021-04-24 VITALS
OXYGEN SATURATION: 97 % | DIASTOLIC BLOOD PRESSURE: 73 MMHG | HEART RATE: 73 BPM | SYSTOLIC BLOOD PRESSURE: 131 MMHG | TEMPERATURE: 96.9 F | RESPIRATION RATE: 16 BRPM

## 2021-04-24 DIAGNOSIS — L97.529 DIABETIC ULCER OF LEFT FOOT ASSOCIATED WITH DIABETES MELLITUS DUE TO UNDERLYING CONDITION, UNSPECIFIED PART OF FOOT, UNSPECIFIED ULCER STAGE (HCC): Primary | ICD-10-CM

## 2021-04-24 DIAGNOSIS — E08.621 DIABETIC ULCER OF LEFT FOOT ASSOCIATED WITH DIABETES MELLITUS DUE TO UNDERLYING CONDITION, UNSPECIFIED PART OF FOOT, UNSPECIFIED ULCER STAGE (HCC): Primary | ICD-10-CM

## 2021-04-24 PROCEDURE — 99213 OFFICE O/P EST LOW 20 MIN: CPT | Performed by: FAMILY MEDICINE

## 2021-04-24 RX ORDER — CEPHALEXIN 500 MG/1
500 CAPSULE ORAL EVERY 12 HOURS SCHEDULED
Qty: 14 CAPSULE | Refills: 0 | Status: SHIPPED | OUTPATIENT
Start: 2021-04-24 | End: 2021-05-01

## 2021-04-24 RX ORDER — CANAGLIFLOZIN 300 MG/1
TABLET, FILM COATED ORAL
COMMUNITY
Start: 2021-04-20

## 2021-04-24 NOTE — PATIENT INSTRUCTIONS
At cover the also with plain Band-Aid but advised patient due to  Considering using corn pad to protect ulcer  He was started empirically on cephalexin 500 mg b i d  for 7 days  He was given a podiatry referral to see Dr Rafael GARCIA    Diabetic Foot Ulcers   WHAT YOU NEED TO KNOW:   A diabetic foot ulcer can be redness over a bony area or an open sore  The ulcer can develop anywhere on your foot or toes  Ulcers usually develop on the bottom of the foot  You may not know you have an ulcer until you notice drainage on your sock  Drainage is fluid that may be yellow, brown, or red  The fluid may also contain pus or blood  DISCHARGE INSTRUCTIONS:   Call your local emergency number (911 in the [address] [facility]) if:  · You have a fever with chills  · You begin vomiting  · You feel faint or become confused  Call your doctor if:   · You see new drainage on your sock  · Your foot becomes red, warm, and swollen  · Your foot ulcer has a bad smell or is draining pus  · You feel pain in a foot that used to have little or no feeling  · You see black or dead tissue in or around your ulcer  · Your ulcer becomes bigger, deeper, or does not heal      · You have questions or concerns about your condition or care  Medicines:   · Antibiotics  may be given to help treat a bacterial infection  · Take your medicine as directed  Contact your healthcare provider if you think your medicine is not helping or if you have side effects  Tell him or her if you are allergic to any medicine  Keep a list of the medicines, vitamins, and herbs you take  Include the amounts, and when and why you take them  Bring the list or the pill bottles to follow-up visits  Carry your medicine list with you in case of an emergency  Care for your wound as directed:  A bandage will be put on your ulcer  Your healthcare provider will give you instructions on changing your bandage   You may need to clean the wound and change the bandage daily  The bandage may contain medicines to help your ulcer heal  You may be asked to put medicine on your foot ulcer before putting on the bandage  The medicine may also prevent growth of tissue that is not healthy  You may need to cover your wound with a plastic bag while you bathe  Ask your healthcare provider for instructions on bathing until your foot heals  Prevent diabetic foot ulcers:  Good foot care may help prevent ulcers, or keep them from getting worse  Ask someone to help you if you are not able to check your feet by yourself  You or another person may need to do any of the following:  · Keep your blood sugar levels under control  Continue the plan for your diabetes that you and your healthcare provider have discussed  Healthy food choices and taking your medicines as directed may help control blood sugars  Contact your healthcare provider if your blood sugar levels are higher than directed  · Wash your feet each day with soap and warm water  Do not use hot water, because this can injure your foot  Dry your feet gently with a towel after you wash them  Dry between and under your toes  · Apply lotion or a moisturizer on your dry feet  Ask your healthcare provider what lotions are best to use  Do not put lotion or moisturizer between your toes  Moisture between your toes could lead to skin breakdown  · Check your feet each day  Look at your whole foot, including the bottom, and between and under your toes  Check for wounds, corns, and calluses  Feel your feet by running your hands along the tops, bottoms, sides, and between your toes  Use a nonbreakable mirror to check your feet if you have trouble seeing the bottoms  Do not try to remove corns or calluses yourself  File or cut your toenails straight across  · Protect your feet  Do not walk barefoot or wear your shoes without socks  Check your shoes for rocks or other objects that can hurt your feet   Wear cotton socks to help keep your feet dry  Wear socks without toe seams, or wear them with the seams inside out  Change your socks each day  Do not wear socks that are dirty or damp  · Wear shoes that fit well  Wear shoes that do not rub against any area of your feet  Your shoes should be ½ to ¾ inch (1 to 2 centimeters) longer than your feet  Your shoes should also have extra space around the widest part of your feet  Walking or athletic shoes with laces or straps that adjust are best  Ask your healthcare provider for help to choose shoes that fit you best  Ask him or her if you need to wear an insert, orthotic, or bandage on your feet  · Do not smoke  Nicotine can cause damage to your blood vessels and increases your risk for foot ulcers  Do not use e-cigarettes or smokeless tobacco in place of cigarettes or to help you quit  They still contain nicotine  Ask your healthcare provider for information if you currently smoke and need help quitting  · Know the risks if you choose to drink alcohol  Alcohol can cause your blood sugar levels to be low if you use insulin  Alcohol can cause high blood sugar levels and weight gain if you drink too much  A drink of alcohol is 12 ounces of beer, 5 ounces of wine, or 1½ ounces of liquor  · Maintain a healthy weight  Ask your healthcare provider how much you should weigh  A healthy weight can help you control your diabetes  Ask him or her to help you create a weight loss plan if you are overweight  Even a 10 to 15 pound weight loss can help you better manage your blood sugar level  Follow up with your healthcare provider or foot specialist as directed: You may need to return often to have your wound checked  Your wound may be measured to see if it is getting smaller  Bring any offloading devices or footwear to your follow-up visits so your healthcare provider or specialist can check them  Write down your questions so you remember to ask them during your visits     © Copyright [employer] [facility] Information is for [employer] use only and may not be sold, redistributed or otherwise used for commercial purposes  All illustrations and images included in CareNotes® are the copyrighted property of A D A M , Inc  or Chris Brizuela   The above information is an  only  It is not intended as medical advice for individual conditions or treatments  Talk to your doctor, nurse or pharmacist before following any medical regimen to see if it is safe and effective for you

## 2021-04-24 NOTE — PROGRESS NOTES
[address] [facility]        NAME: Luciano Alcantara is a 77 y o  male  : 1955    MRN: [id number]  DATE: 2021  TIME: 11:23 AM    Assessment and Plan   Diabetic ulcer of left foot associated with diabetes mellitus due to underlying condition, unspecified part of foot, unspecified ulcer stage ([facility] ) [E08 621, L97 529]  1  Diabetic ulcer of left foot associated with diabetes mellitus due to underlying condition, unspecified part of foot, unspecified ulcer stage ([facility] )  Ambulatory referral to Podiatry    cephalexin (KEFLEX) 500 mg capsule         Patient Instructions       Follow up with PCP in 3-5 days  Proceed to  ER if symptoms worsen  Chief Complaint     Chief Complaint   Patient presents with    Foot Pain     Patient presents with a diabetic left foot wound that started about 3 weeks ago when he wore new shoes and rubbed open a corn on his toe  He is scheduled to see podiatry on May 6th  History of Present Illness        53-year-old male here today with complaints of a lesion on the top of his left 3rd toe for the last 3 weeks  Denies any fall or trauma  Patient has a history of type 2 diabetes and neuropathy  He noticed at this sore is not getting better  Denies any fever or purulent discharge  He has an appointment with his podiatrist but not until May 3rd  He wanted to  Have his left foot examined  Describes previous history of right foot ulcer treated by another podiatrist within the past year  Review of Systems   Review of Systems   Constitutional: Negative  Skin: Positive for wound           Current Medications       Current Outpatient Medications:     aspirin 325 mg tablet, Take 325 mg by mouth daily, Disp: , Rfl:     Blood Glucose Monitoring Suppl (ONETOUCH VERIO) w/Device KIT, Use to test 2-3x daily, Disp: 1 kit, Rfl: 1    celecoxib (CeleBREX) 200 mg capsule, Take 200 mg by mouth daily, Disp: , Rfl:     cyclobenzaprine (FLEXERIL) 10 mg tablet, 1 tab qd prn, Disp: 90 tablet, Rfl: 0    glucose blood (OneTouch Verio) test strip, Use 2-3x daily, Disp: 100 each, Rfl: 3    Insulin Pen Needle 32G X 6 MM MISC, BD Ultra-Fine Mini Pen Needle 31 gauge x 3/16", Disp: , Rfl:     Invokana 300 MG TABS, , Disp: , Rfl:     lisinopril (ZESTRIL) 10 mg tablet, Take 4 tablets (40 mg total) by mouth daily, Disp: 90 tablet, Rfl: 1    metFORMIN (GLUCOPHAGE) 500 mg tablet, One tab by mouth with breakfast and one tab by mouth with dinner, Disp: 60 tablet, Rfl: 4    NOVOLOG MIX 70/30 FLEXPEN (70-30) 100 units/mL injection pen, INJECT 30 UNITS UNDER THE SKIN 2 (TWO) TIMES A DAY BEFORE MEALS , Disp: , Rfl: 3    OneTouch Delica Lancets 65Q MISC, Use to test 2-3x daily, Disp: 100 each, Rfl: 3    Semaglutide,0 25 or 0 5MG/DOS, 2 MG/1 5ML SOPN, Inject 0 25mg weekly for the first 4 weeks  Then increase to 0 5mg weekly  , Disp: , Rfl:     tamsulosin (FLOMAX) 0 4 mg, Take 1 capsule (0 4 mg total) by mouth daily with dinner, Disp: 90 capsule, Rfl: 1    cephalexin (KEFLEX) 500 mg capsule, Take 1 capsule (500 mg total) by mouth every 12 (twelve) hours for 7 days, Disp: 14 capsule, Rfl: 0    silver sulfadiazine (SILVADENE,SSD) 1 % cream, Apply topically daily (Patient not taking: Reported on 4/24/2021), Disp: 50 g, Rfl: 0    Current Allergies     Allergies as of 04/24/2021 - Reviewed 04/24/2021   Allergen Reaction Noted    No known allergies  10/08/2019            The following portions of the patient's history were reviewed and updated as appropriate: allergies, current medications, past family history, past medical history, past social history, past surgical history and problem list      Past Medical History:   Diagnosis Date    Colon polyp     Diabetes mellitus ([address] )     Hypertension        Past Surgical History:   Procedure Laterality Date    COLONOSCOPY      ROTATOR CUFF REPAIR Left        Family History   Problem Relation Age of Onset    Alzheimer's disease Mother     Stomach cancer Father     Cancer Father [de-identified]        stomach         Medications have been verified  Objective   /73   Pulse 73   Temp (!) 96 9 °F (36 1 °C) (Tympanic)   Resp 16   SpO2 97%   No LMP for male patient  Physical Exam     Physical Exam  Vitals signs and nursing note reviewed  Constitutional:       Appearance: Normal appearance  Cardiovascular:      Pulses: Normal pulses  Skin:     Comments: Left foot:  Reveals hammertoes of the left 2nd 3rd and 4th toe  There is superficial ulcer over the left 3rd toe measuring about 1 cm in circumference with hyperkeratotic border  It extends into the dermis  Neurological:      Mental Status: He is alert

## 2021-04-29 ENCOUNTER — OFFICE VISIT (OUTPATIENT)
Dept: PODIATRY | Facility: CLINIC | Age: 66
End: 2021-04-29
Payer: COMMERCIAL

## 2021-04-29 VITALS
DIASTOLIC BLOOD PRESSURE: 82 MMHG | SYSTOLIC BLOOD PRESSURE: 123 MMHG | BODY MASS INDEX: 35.94 KG/M2 | WEIGHT: 311 LBS | HEART RATE: 88 BPM

## 2021-04-29 DIAGNOSIS — E08.621 DIABETIC ULCER OF LEFT FOOT ASSOCIATED WITH DIABETES MELLITUS DUE TO UNDERLYING CONDITION, UNSPECIFIED PART OF FOOT, UNSPECIFIED ULCER STAGE (HCC): Primary | ICD-10-CM

## 2021-04-29 DIAGNOSIS — L97.529 DIABETIC ULCER OF LEFT FOOT ASSOCIATED WITH DIABETES MELLITUS DUE TO UNDERLYING CONDITION, UNSPECIFIED PART OF FOOT, UNSPECIFIED ULCER STAGE (HCC): Primary | ICD-10-CM

## 2021-04-29 DIAGNOSIS — E11.42 DIABETIC POLYNEUROPATHY ASSOCIATED WITH TYPE 2 DIABETES MELLITUS (HCC): ICD-10-CM

## 2021-04-29 PROCEDURE — 11042 DBRDMT SUBQ TIS 1ST 20SQCM/<: CPT | Performed by: PODIATRIST

## 2021-04-29 PROCEDURE — 99214 OFFICE O/P EST MOD 30 MIN: CPT | Performed by: PODIATRIST

## 2021-04-29 NOTE — PROGRESS NOTES
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  Pt presents with wound  States he wore a new pair of shoes that rubbed his 2nd toe right foot  Pt arrives with dry dressing intact  The following portions of the patient's history were reviewed and updated as appropriate: allergies, current medications, past family history, past medical history, past social history, past surgical history and problem list     Review of Systems   Constitutional: Negative  Skin: Positive for wound  Neurological: Positive for numbness  OBJECTIVE      /82   Pulse 88   Wt (!) 141 kg (311 lb)   BMI 35 94 kg/m²     Foot/Ankle Musculoskeletal Exam    General      Neurological: alert    Neurovascular      Neurovascular - Right        Dorsalis pedis: 2+      Posterior tibial: 2+      Neurovascular - Left        Dorsalis pedis: 2+      Posterior tibial: 2+       Physical Exam  Vitals signs and nursing note reviewed  Constitutional:       General: He is not in acute distress  Appearance: Normal appearance  He is not ill-appearing, toxic-appearing or diaphoretic  Cardiovascular:      Pulses:           Dorsalis pedis pulses are 2+ on the right side and 2+ on the left side  Posterior tibial pulses are 2+ on the right side and 2+ on the left side  Feet:      Right foot:      Protective Sensation: 10 sites tested  2 sites sensed  Left foot:      Protective Sensation: 10 sites tested  3 sites sensed  Neurological:      Mental Status: He is alert           Wound # 1  Location: right 2nd toe  Length 0 3cm: Width 0 3cm: Depth 0 2cm:   Deepest Tissue Noted in Base: SQ  Probe to Bone?: no  Peripheral Skin Description: callus  Granulation: 90% Fibrotic Tissue: 10% Necrotic Tissue: 0%  Drainage Amount: minimal  Signs of Infection: no  Total debrided 0 09 square centimeters      ASSESSMENT     Diagnoses and all orders for this visit:    Diabetic ulcer of left foot associated with diabetes mellitus due to underlying condition, unspecified part of foot, unspecified ulcer stage ([facility] )  -     Ambulatory referral to Podiatry  -     X-ray foot left 3+ views; Future         Problem List Items Addressed This Visit     None      Visit Diagnoses     Diabetic ulcer of left foot associated with diabetes mellitus due to underlying condition, unspecified part of foot, unspecified ulcer stage ([facility] )    -  Primary    Relevant Orders    X-ray foot left 3+ views            Problems:      Chronic illness / Problem not at goal with exacerbation, progression or side effects of treatment  1  Diabetic peripheral neuropathy with foot ulcer    PLAN    Xrays ordered of foot    I performed a wet read of the foot xrays noting no obvious erosion of the bone adjacent to the ulcer  A formal timeout including patient identification, laterality and existing allergies using [facility] protocol was conducted  Procedure Performed: Debridement of subcutaneous tissue- >20 sq cm (03790)  Under aseptic technique, the wound was thoroughly explored and bluntly probed for undermining, deep sinus tracts and bone involvement  Sterile instrumentation including scalpel, forceps and curette used to excise the clearly delineated devitalized subcutaneous tissue (fibrotic tissue and necrotic non-viable portions of fat) exposing viable tissue  After debridement, bleeding in the wound bed base was noted indicated viable subcutaneous tissue had been exposed  Bleeding controlled with gentle pressure  Patient tolerated debridement without complications  The wound was dressed  Wound dressing technique and frequency described to patient   I am to be called if any signs of infection develop such as erythema, increased wound size or significant change in appearance of wound, odor, pain, increased or change in color of drainage, swelling, fever, chills, nightsweats  I reviewed these signs with the patient  I recommended limiting WB to bathroom priveleges and meal table and to use any prescribed offloading devices in an effort to allow proper rest and healing of the wounded area  I explained that good wound care and compliance are necessary to allow healing and to prevent toe loss or limb loss  Silver gel applied to wound, gauze and tape  To be changed daily, pt to obtain silver gel from [facility]  Surgical shoe dispensed, x-ray ordered  Pt to return in 3 weeks, call with signs and symptoms of infection, pt verbalized understanding

## 2021-05-17 ENCOUNTER — OFFICE VISIT (OUTPATIENT)
Dept: FAMILY MEDICINE CLINIC | Facility: CLINIC | Age: 66
End: 2021-05-17
Payer: COMMERCIAL

## 2021-05-17 VITALS
DIASTOLIC BLOOD PRESSURE: 64 MMHG | OXYGEN SATURATION: 98 % | WEIGHT: 310.6 LBS | BODY MASS INDEX: 35.94 KG/M2 | HEIGHT: 78 IN | SYSTOLIC BLOOD PRESSURE: 118 MMHG | TEMPERATURE: 97 F | RESPIRATION RATE: 17 BRPM | HEART RATE: 82 BPM

## 2021-05-17 DIAGNOSIS — I10 ESSENTIAL HYPERTENSION: ICD-10-CM

## 2021-05-17 DIAGNOSIS — Z79.4 TYPE 2 DIABETES MELLITUS WITHOUT COMPLICATION, WITH LONG-TERM CURRENT USE OF INSULIN (HCC): Primary | ICD-10-CM

## 2021-05-17 DIAGNOSIS — E78.2 MIXED HYPERLIPIDEMIA: ICD-10-CM

## 2021-05-17 DIAGNOSIS — E11.9 TYPE 2 DIABETES MELLITUS WITHOUT COMPLICATION, WITH LONG-TERM CURRENT USE OF INSULIN (HCC): Primary | ICD-10-CM

## 2021-05-17 PROCEDURE — 3725F SCREEN DEPRESSION PERFORMED: CPT | Performed by: FAMILY MEDICINE

## 2021-05-17 PROCEDURE — 3008F BODY MASS INDEX DOCD: CPT | Performed by: PODIATRIST

## 2021-05-17 PROCEDURE — 99214 OFFICE O/P EST MOD 30 MIN: CPT | Performed by: FAMILY MEDICINE

## 2021-05-17 RX ORDER — EZETIMIBE AND SIMVASTATIN 10; 20 MG/1; MG/1
TABLET ORAL
COMMUNITY
Start: 2021-04-20

## 2021-05-17 NOTE — PROGRESS NOTES
Assessment/Plan:    Patient is 13-year-old male with type 2 diabetes, hypertension, hyperlipidemia seen to reestablish care  A1c in March was 8 9  We discussed his medication and diet and need to get better control of his diabetes to avoid complications  He was given orders for fasting blood work and he will return in 3 months  Hopefully we will see a reduction in his A1c  Diagnoses and all orders for this visit:    Type 2 diabetes mellitus without complication, with long-term current use of insulin (HCC)  -     Comprehensive metabolic panel; Future  -     TSH, 3rd generation with Free T4 reflex; Future  -     HEMOGLOBIN A1C W/ EAG ESTIMATION; Future  -     CBC and differential; Future  -     Microalbumin / creatinine urine ratio; Future    Essential hypertension  -     Comprehensive metabolic panel; Future  -     TSH, 3rd generation with Free T4 reflex; Future  -     CBC and differential; Future    Mixed hyperlipidemia  -     Lipid Panel with Direct LDL reflex; Future  -     Comprehensive metabolic panel; Future  -     TSH, 3rd generation with Free T4 reflex; Future  -     CBC and differential; Future    Other orders  -     ezetimibe-simvastatin (VYTORIN) 10-20 mg per tablet; TAKE 1 TABLET BY MOUTH 4 TIMES A WEEK AT 9 AM           Subjective:   Chief Complaint   Patient presents with    Follow-up        Patient ID: Luciano Alcantara is a 77 y o  male  Patient is her to follow up for diabetes  His last A1C was 8 9  Went through diabets education at [employer]  He snacks late after supper  Denies alcohol      The following portions of the patient's history were reviewed and updated as appropriate: allergies, current medications, past family history, past medical history, past social history, past surgical history and problem list     Review of Systems   Constitutional: Negative for chills and fever  HENT: Negative for congestion and sore throat  Respiratory: Negative for chest tightness  Cardiovascular: Negative for chest pain and palpitations  Gastrointestinal: Negative for abdominal pain, constipation, diarrhea and nausea  Genitourinary: Negative for difficulty urinating  Skin: Negative  Neurological: Negative for dizziness and headaches  Psychiatric/Behavioral: Negative  Objective:      /64 (BP Location: Left arm, Patient Position: Sitting, Cuff Size: Large)   Pulse 82   Temp (!) 97 °F (36 1 °C) (Tympanic)   Resp 17   Ht 6' 6 4" (1 991 m)   Wt (!) 141 kg (310 lb 9 6 oz)   SpO2 98%   BMI 35 53 kg/m²          Physical Exam  Vitals signs and nursing note reviewed  Constitutional:       General: He is not in acute distress  Appearance: He is obese  HENT:      Head: Normocephalic  Neck:      Thyroid: No thyromegaly  Cardiovascular:      Rate and Rhythm: Normal rate and regular rhythm  Heart sounds: Normal heart sounds  Pulmonary:      Effort: Pulmonary effort is normal       Breath sounds: Normal breath sounds  Musculoskeletal:      Right lower leg: No edema  Left lower leg: No edema  Comments: Left foot in boot   Lymphadenopathy:      Cervical: No cervical adenopathy  Skin:     General: Skin is warm and dry  Neurological:      Mental Status: He is alert and oriented to person, place, and time     Psychiatric:         Mood and Affect: Mood normal

## 2021-05-20 ENCOUNTER — OFFICE VISIT (OUTPATIENT)
Dept: PODIATRY | Facility: CLINIC | Age: 66
End: 2021-05-20
Payer: COMMERCIAL

## 2021-05-20 VITALS
BODY MASS INDEX: 35.46 KG/M2 | HEART RATE: 98 BPM | DIASTOLIC BLOOD PRESSURE: 71 MMHG | SYSTOLIC BLOOD PRESSURE: 124 MMHG | WEIGHT: 310 LBS

## 2021-05-20 DIAGNOSIS — M20.41 HAMMERTOES OF BOTH FEET: ICD-10-CM

## 2021-05-20 DIAGNOSIS — Z86.31 PERSONAL HISTORY OF DIABETIC FOOT ULCER: Primary | ICD-10-CM

## 2021-05-20 DIAGNOSIS — M20.42 HAMMERTOES OF BOTH FEET: ICD-10-CM

## 2021-05-20 DIAGNOSIS — E11.42 DIABETIC POLYNEUROPATHY ASSOCIATED WITH TYPE 2 DIABETES MELLITUS (HCC): ICD-10-CM

## 2021-05-20 PROCEDURE — 99213 OFFICE O/P EST LOW 20 MIN: CPT | Performed by: PODIATRIST

## 2021-05-20 PROCEDURE — 1160F RVW MEDS BY RX/DR IN RCRD: CPT | Performed by: PODIATRIST

## 2021-05-20 PROCEDURE — 1036F TOBACCO NON-USER: CPT | Performed by: PODIATRIST

## 2021-05-20 NOTE — PATIENT INSTRUCTIONS
Hammertoe Correction   WHAT YOU NEED TO KNOW:   A hammertoe is abnormal bending of your toe  The part of your toe attached to your foot bends up, and the tip bends down  Hammertoe correction is surgery to straighten your toe  HOW TO PREPARE:   The week before your surgery:   · Arrange to have someone drive you home  If you are having general anesthesia, the person should stay with you for 24 hours  The person can watch for any problems from the anesthesia and call for help if needed  · Tell your healthcare provider about all medicines you currently take  He or she will tell you if you need to stop taking any medicines before your surgery  Your provider will tell you which medicines to take or not take on the day of your surgery  · Tell your provider about any allergies you have  Tell him or her if you have ever had an allergic reaction to anesthesia or antibiotics  You may be given antibiotics before surgery to prevent an infection caused by bacteria  · You may need blood tests before your surgery  You may also need x-rays, a bone scan, or an MRI of your foot  The night before your surgery: Your healthcare provider may tell you not to eat or drink anything after midnight  The day of your surgery:   · You or a close family member will be asked to sign a legal document called a consent form  It gives healthcare providers permission to do the procedure or surgery  It also explains the problems that may happen, and your choices  Make sure all your questions are answered before you sign this form  · Healthcare providers may insert an intravenous tube (IV) into your vein  A vein in the arm is usually chosen  Through the IV tube, you may be given liquids and medicine  · An anesthesiologist will talk to you before your surgery  You may need medicine to keep you asleep or numb an area of your body during surgery   Tell healthcare providers if you or anyone in your family has had a problem with anesthesia in the past     · Take only the medicines your healthcare provider told you to take  WHAT WILL HAPPEN:   What will happen:   · One or more incisions will be made in your toe where it attaches to your foot  The kind of surgery you have depends on how much your toe bends  Your surgeon may loosen tendons or ligaments that are causing the abnormal bending  Tendons are bands of tough tissue that connect muscle to bone  Ligaments are bands of tough tissue that connect bones  Your surgeon may move part of one tendon and connect it to a different tendon  · Some of the joint or bone tissue may be removed to straighten your toe  Your surgeon may place a wire, screws, or pins through your toe bones to keep your toe straight  The incision will be closed with stitches  After your surgery: You will be taken to a room to rest until you are fully awake  Healthcare providers will monitor you closely for any problems  Do not get out of bed until your healthcare provider says it is okay  When your healthcare provider sees that you are okay, you may be able to go home  CONTACT YOUR HEALTHCARE PROVIDER IF:   · You have questions or concerns about your surgery  RISKS:   You may have long-term pain, stiffness, swelling, or numbness in your toe  Your toe may be weak or not sit flat on the ground  A wire or screw placed during surgery may break or come out  You may develop an infection  Damaged blood vessels can lead to gangrene or loss of your toe  CARE AGREEMENT:   You have the right to help plan your care  Learn about your health condition and how it may be treated  Discuss treatment options with your healthcare providers to decide what care you want to receive  You always have the right to refuse treatment  © Copyright [address] Information is for End User's use only and may not be sold, redistributed or otherwise used for commercial purposes   All illustrations and images included in CareNotes® are the copyrighted property of A D A M [employer] Inc  or [facility] Linda Brizuela   The above information is an  only  It is not intended as medical advice for individual conditions or treatments  Talk to your doctor, nurse or pharmacist before following any medical regimen to see if it is safe and effective for you

## 2021-05-20 NOTE — PROGRESS NOTES
This patient was seen on 5/20/21  My role is Foot , Ankle, and Wound Specialist    SUBJECTIVE    Chief Complaint:  Recent toe ulcer Left     Patient ID: Luciano Alcantara is a 77 y o  male  Pt presents with prior wound that is no longer draining  Pt arrives with dry dressing intact  The following portions of the patient's history were reviewed and updated as appropriate: allergies, current medications, past family history, past medical history, past social history, past surgical history and problem list     Review of Systems   Constitutional: Negative  Respiratory: Negative  Cardiovascular: Negative  Gastrointestinal: Negative  Neurological: Positive for numbness  OBJECTIVE      /71   Pulse 98   Wt (!) 141 kg (310 lb)   BMI 35 46 kg/m²     Foot/Ankle Musculoskeletal Exam    General        Constitutional: well-developed      General additional comments: Hammertoes 2, 3 bilateral feet  Mild HAV bilateral feet  Neurovascular      Neurovascular - Right        Dorsalis pedis: 2+      Posterior tibial: 2+      Neurovascular - Left        Dorsalis pedis: 2+      Posterior tibial: 2+       Physical Exam  Constitutional:       General: He is not in acute distress  Appearance: He is well-developed  He is not diaphoretic  HENT:      Head: Normocephalic  Eyes:      Pupils: Pupils are equal, round, and reactive to light  Neck:      Musculoskeletal: Normal range of motion  Cardiovascular:      Pulses:           Dorsalis pedis pulses are 2+ on the right side and 2+ on the left side  Posterior tibial pulses are 2+ on the right side and 2+ on the left side  Pulmonary:      Effort: Pulmonary effort is normal       Breath sounds: Normal breath sounds  Abdominal:      General: Bowel sounds are normal       Palpations: Abdomen is soft  Musculoskeletal:      Comments: Hammertoes 2, 3 bilateral feet  Mild HAV bilateral feet      Feet:      Right foot:      Protective Sensation: 10 sites tested  2 sites sensed  Skin integrity: Ulcer present  Left foot:      Protective Sensation: 10 sites tested  3 sites sensed  The toe ulcer Left 2nd toe is healed  There is some hyperkeratotic tissue present  ASSESSMENT     Diagnoses and all orders for this visit:    Personal history of diabetic foot ulcer  -     VAS lower limb arterial duplex, complete bilateral; Future    Diabetic polyneuropathy associated with type 2 diabetes mellitus ([facility])    Hammertoes of both feet         Problem List Items Addressed This Visit        Endocrine    Diabetic polyneuropathy associated with type 2 diabetes mellitus ([facility] [address] )       Musculoskeletal and Integument    Hammertoes of both feet       Other    Personal history of diabetic foot ulcer - Primary    Relevant Orders    VAS lower limb arterial duplex, complete bilateral            Problems:    Diabetes Mellitus  Hammertoes  Prior diabetic foot ulcer    PLAN    I reviewed his last HA1C on 3/5/21 which was a 8 9%  I told him that I feel that surgical correction of the hammertoes should be considered to alleviate the constant irritation and pressure in shoegear which will inevitably lead to future recurrent ulcers  I feel we need to hold off on surgery however until his A1C is < 8% to reduce chances of post-operative complications  I also want a formal arterial doppler to check the perfusion to the toes to make sure that any elective incisions on the feet will heal properly  In the meantime, I feel he needs to continue the open surgical shoes which will limit direct pressure on the skin over the toe deformities

## 2021-06-01 PROBLEM — L97.529 DIABETIC ULCER OF LEFT FOOT ASSOCIATED WITH DIABETES MELLITUS DUE TO UNDERLYING CONDITION (HCC): Status: ACTIVE | Noted: 2021-06-01

## 2021-06-01 PROBLEM — E08.621 DIABETIC ULCER OF LEFT FOOT ASSOCIATED WITH DIABETES MELLITUS DUE TO UNDERLYING CONDITION (HCC): Status: ACTIVE | Noted: 2021-06-01

## 2021-06-01 NOTE — PATIENT INSTRUCTIONS
Debridement   WHAT YOU NEED TO KNOW:   Debridement is the removal of infected, damaged, or dead tissue so a wound can heal properly  You may need more than one debridement  DISCHARGE INSTRUCTIONS:   Medicines:   · Medicines  can help decrease pain or prevent or treat an infection  · Take your medicine as directed  Contact your healthcare provider if you think your medicine is not helping or if you have side effects  Tell him of her if you are allergic to any medicine  Keep a list of the medicines, vitamins, and herbs you take  Include the amounts, and when and why you take them  Bring the list or the pill bottles to follow-up visits  Carry your medicine list with you in case of an emergency  Follow up with your healthcare provider as directed: You may need to return to have your wound checked  Write down your questions so you remember to ask them during your visits  Care for your wound as directed:   · Keep your wound clean and dry  You may need to cover your wound when you bathe  · Limit movements,  such as stretching, to prevent bleeding, tearing, and swelling in your wound  · Protect your wound  Avoid sunlight for at least 6 months  Apply mild, unscented lotion or cream to the skin around your wound to keep it moist     · Do not smoke  If you smoke, it is never too late to quit  Smoking decreases blood flow to the wound and delays healing  Ask for information if you need help quitting  · Drink liquids as directed  Ask how much liquid to drink each day and which liquids are best for you  Liquids help keep your skin moist so your wound can heal      · Eat a variety of healthy foods  Foods rich in protein, such as meat, eggs, and dairy products, help repair tissue  Carbohydrate-rich foods, such as bread and cereals, help increase cell growth and decrease the risk for wound infection  Do not have caffeine  Ask if you should take vitamins   Vitamins A and C may help tissue formation and increase scar tissue strength  Contact your healthcare provider if:   · You have a fever  · Your pain gets worse or does not go away, even after treatment  · Your skin is red, swollen, or draining pus  · You have questions or concerns about your condition or care  Return to the emergency department if:   · Blood soaks through your bandage  · You have severe pain  © Copyright [address] Information is for End User's use only and may not be sold, redistributed or otherwise used for commercial purposes  All illustrations and images included in CareNotes® are the copyrighted property of [employer]  or [facility] [place] Linda Brizuela   The above information is an  only  It is not intended as medical advice for individual conditions or treatments  Talk to your doctor, nurse or pharmacist before following any medical regimen to see if it is safe and effective for you

## 2021-06-18 ENCOUNTER — TELEPHONE (OUTPATIENT)
Dept: ADMINISTRATIVE | Facility: OTHER | Age: 66
End: 2021-06-18

## 2021-06-18 NOTE — TELEPHONE ENCOUNTER
Upon review of the In Basket request we have found/obtained the documentation  After careful review of the document we are unable to complete this request for Diabetic Foot Exam because the documentation does not have the result(s) needed to close the requested care gap(s)  Any additional questions or concerns should be emailed to the Practice Liaisons via [email or web address] email, please do not reply via In Basket      Thank you  Irineo Wilson

## 2021-06-18 NOTE — TELEPHONE ENCOUNTER
----- Message from Beto Aguirre MA sent at 6/18/2021  9:58 AM EDT -----  Regarding: Care Gap Request  06/18/21 9:58 AM    Hello, our patient Sheryle Latina has had Diabetic Foot Exam completed/performed  Please assist in updating the patient chart by pulling the document from the Media Tab  The date of service is 4/29/21- done at podiatry       Thank you,  Beto Aguirre MA  [facility]

## 2021-06-30 ENCOUNTER — OFFICE VISIT (OUTPATIENT)
Dept: PODIATRY | Facility: CLINIC | Age: 66
End: 2021-06-30
Payer: COMMERCIAL

## 2021-06-30 VITALS
WEIGHT: 308 LBS | DIASTOLIC BLOOD PRESSURE: 77 MMHG | BODY MASS INDEX: 35.64 KG/M2 | HEART RATE: 69 BPM | HEIGHT: 78 IN | SYSTOLIC BLOOD PRESSURE: 128 MMHG

## 2021-06-30 DIAGNOSIS — M20.41 HAMMERTOES OF BOTH FEET: Primary | ICD-10-CM

## 2021-06-30 DIAGNOSIS — E08.621 DIABETIC ULCER OF LEFT FOOT ASSOCIATED WITH DIABETES MELLITUS DUE TO UNDERLYING CONDITION, UNSPECIFIED PART OF FOOT, UNSPECIFIED ULCER STAGE (HCC): ICD-10-CM

## 2021-06-30 DIAGNOSIS — L97.529 DIABETIC ULCER OF LEFT FOOT ASSOCIATED WITH DIABETES MELLITUS DUE TO UNDERLYING CONDITION, UNSPECIFIED PART OF FOOT, UNSPECIFIED ULCER STAGE (HCC): ICD-10-CM

## 2021-06-30 DIAGNOSIS — M20.42 HAMMERTOES OF BOTH FEET: Primary | ICD-10-CM

## 2021-06-30 DIAGNOSIS — E11.42 DIABETIC POLYNEUROPATHY ASSOCIATED WITH TYPE 2 DIABETES MELLITUS (HCC): ICD-10-CM

## 2021-06-30 PROCEDURE — 3008F BODY MASS INDEX DOCD: CPT | Performed by: PODIATRIST

## 2021-06-30 PROCEDURE — 1036F TOBACCO NON-USER: CPT | Performed by: PODIATRIST

## 2021-06-30 PROCEDURE — 99213 OFFICE O/P EST LOW 20 MIN: CPT | Performed by: PODIATRIST

## 2021-06-30 PROCEDURE — 1160F RVW MEDS BY RX/DR IN RCRD: CPT | Performed by: PODIATRIST

## 2021-06-30 RX ORDER — CYCLOBENZAPRINE HCL 5 MG
TABLET ORAL
COMMUNITY
Start: 2021-06-02

## 2021-06-30 NOTE — PROGRESS NOTES
This patient was seen on 6/30/21  My role is Foot , Ankle, and Wound Specialist    SUBJECTIVE    Chief Complaint:  Hammertoes, prior foot ulcers     Patient ID: Graham Agrawal is a 77 y o  male  Pt presents with prior wound that is no longer draining  Pt arrives with dry dressing intact  He wears an open surgical shoe  I had ordered an arterial doppler of his lower extremities and he has yet to schedule it  The following portions of the patient's history were reviewed and updated as appropriate: allergies, current medications, past family history, past medical history, past social history, past surgical history and problem list     Review of Systems   Constitutional: Negative  Respiratory: Negative  Cardiovascular: Negative  Gastrointestinal: Negative  Neurological: Positive for numbness  OBJECTIVE      /77   Pulse 69   Ht 6' 6 5" (1 994 m) Comment: verbal  Wt (!) 140 kg (308 lb)   BMI 35 14 kg/m²     Foot/Ankle Musculoskeletal Exam    General        Constitutional: well-developed      General additional comments: Hammertoes 2, 3 bilateral feet  Mild HAV bilateral feet  Neurovascular      Neurovascular - Right        Dorsalis pedis: 2+      Posterior tibial: 2+      Neurovascular - Left        Dorsalis pedis: 2+      Posterior tibial: 2+       Physical Exam  Constitutional:       General: He is not in acute distress  Appearance: He is well-developed  He is not diaphoretic  HENT:      Head: Normocephalic  Eyes:      Pupils: Pupils are equal, round, and reactive to light  Cardiovascular:      Pulses:           Dorsalis pedis pulses are 2+ on the right side and 2+ on the left side  Posterior tibial pulses are 2+ on the right side and 2+ on the left side  Pulmonary:      Effort: Pulmonary effort is normal       Breath sounds: Normal breath sounds  Abdominal:      General: Bowel sounds are normal       Palpations: Abdomen is soft     Musculoskeletal: Cervical back: Normal range of motion  Comments: Hammertoes 2, 3 bilateral feet  Mild HAV bilateral feet  Feet:      Right foot:      Protective Sensation: 10 sites tested  2 sites sensed  Skin integrity: Ulcer present  Left foot:      Protective Sensation: 10 sites tested  3 sites sensed  ASSESSMENT     Diagnoses and all orders for this visit:    Hammertoes of both feet    Diabetic ulcer of left foot associated with diabetes mellitus due to underlying condition, unspecified part of foot, unspecified ulcer stage (HCC)    Diabetic polyneuropathy associated with type 2 diabetes mellitus ([place] [address] )    Other orders  -     Influenza Virus Vacc Split PF 0 5 ML MOR; Afluria 0521-9895([id number]) 45 mcg (15 mcg x 3)/0 5 mL intramuscular syringe   inject 0 5 milliliter intramuscularly  -     cyclobenzaprine (FLEXERIL) 5 mg tablet; TAKE 1 TO 2 TABLETS BY MOUTH 3 TIMES A DAY AS NEEDED FOR MUSCLE SPASMS         Problem List Items Addressed This Visit        Endocrine    Diabetic polyneuropathy associated with type 2 diabetes mellitus ([place] [address] )    Diabetic ulcer of left foot associated with diabetes mellitus due to underlying condition ([place] [address] )       Musculoskeletal and Integument    Hammertoes of both feet - Primary            Problems:      Chronic illness / Problem not at goal with exacerbation, progression or side effects of treatment  1  Diabetic peripheral neuropathy with prior foot ulcers  2  Hammertoes deformities with prior foot ulcers      PLAN    I recommend a small DSD to the 2nd toe Left and the open surgical shoe to prevent ulcers recurrence  I recommended he complete the dopplers so we can check his flow and plan hammertoe correction (and possible Akin) so that we can resolve the foot deformities and allow a return to normal shoegear

## 2021-07-11 ENCOUNTER — OFFICE VISIT (OUTPATIENT)
Dept: URGENT CARE | Facility: MEDICAL CENTER | Age: 66
End: 2021-07-11
Payer: COMMERCIAL

## 2021-07-11 VITALS
WEIGHT: 306 LBS | BODY MASS INDEX: 35.4 KG/M2 | HEART RATE: 81 BPM | OXYGEN SATURATION: 98 % | HEIGHT: 78 IN | TEMPERATURE: 98 F | DIASTOLIC BLOOD PRESSURE: 74 MMHG | SYSTOLIC BLOOD PRESSURE: 169 MMHG

## 2021-07-11 DIAGNOSIS — M54.50 ACUTE MIDLINE LOW BACK PAIN WITHOUT SCIATICA: Primary | ICD-10-CM

## 2021-07-11 PROCEDURE — 99213 OFFICE O/P EST LOW 20 MIN: CPT | Performed by: PHYSICIAN ASSISTANT

## 2021-07-11 RX ORDER — LIDOCAINE 50 MG/G
1 PATCH TOPICAL DAILY
Qty: 30 PATCH | Refills: 0 | Status: SHIPPED | OUTPATIENT
Start: 2021-07-11 | End: 2021-08-31 | Stop reason: ALTCHOICE

## 2021-07-11 NOTE — PATIENT INSTRUCTIONS
Back Pain   WHAT YOU NEED TO KNOW:   Back pain is common  It can be caused by many conditions, such as arthritis or the breakdown of spinal discs  Your risk for back pain is increased by injuries, lack of activity, or repeated bending and twisting  You may feel sore or stiff on one or both sides of your back  The pain may spread to your buttocks or thighs  DISCHARGE INSTRUCTIONS:   Return to the emergency department if:   · You have pain, numbness, or weakness in one or both legs  · Your pain becomes so severe that you cannot walk  · You cannot control your urine or bowel movements  · You have severe back pain with chest pain  · You have severe back pain, nausea, and vomiting  · You have severe back pain that spreads to your side or genital area  Contact your healthcare provider if:   · You have back pain that does not get better with rest and pain medicine  · You have a fever  · You have pain that worsens when you are on your back or when you rest     · You have pain that worsens when you cough or sneeze  · You lose weight without trying  · You have questions or concerns about your condition or care  Medicines:   · NSAIDs  help decrease swelling and pain  This medicine is available with or without a doctor's order  NSAIDs can cause stomach bleeding or kidney problems in certain people  If you take blood thinner medicine, always ask your healthcare provider if NSAIDs are safe for you  Always read the medicine label and follow directions  · Acetaminophen  decreases pain and fever  It is available without a doctor's order  Ask how much to take and how often to take it  Follow directions  Read the labels of all other medicines you are using to see if they also contain acetaminophen, or ask your doctor or pharmacist  Acetaminophen can cause liver damage if not taken correctly  Do not use more than 4 grams (4,000 milligrams) total of acetaminophen in one day       · Muscle relaxers help decrease muscle spasms and back pain  · Prescription pain medicine  may be given  Ask your healthcare provider how to take this medicine safely  Some prescription pain medicines contain acetaminophen  Do not take other medicines that contain acetaminophen without talking to your healthcare provider  Too much acetaminophen may cause liver damage  Prescription pain medicine may cause constipation  Ask your healthcare provider how to prevent or treat constipation  · Take your medicine as directed  Contact your healthcare provider if you think your medicine is not helping or if you have side effects  Tell him or her if you are allergic to any medicine  Keep a list of the medicines, vitamins, and herbs you take  Include the amounts, and when and why you take them  Bring the list or the pill bottles to follow-up visits  Carry your medicine list with you in case of an emergency  How to manage your back pain:   · Apply ice  on your back for 15 to 20 minutes every hour or as directed  Use an ice pack, or put crushed ice in a plastic bag  Cover it with a towel before you apply it to your skin  Ice helps prevent tissue damage and decreases pain  · Apply heat  on your back for 20 to 30 minutes every 2 hours for as many days as directed  Heat helps decrease pain and muscle spasms  · Stay active  as much as you can without causing more pain  Bed rest could make your back pain worse  Avoid heavy lifting until your pain is gone  · Go to physical therapy as directed  A physical therapist can teach you exercises to help improve movement and strength, and to decrease pain  Follow up with your healthcare provider in 2 weeks, or as directed:  Write down your questions so you remember to ask them during your visits  © Copyright [address] Information is for End User's use only and may not be sold, redistributed or otherwise used for commercial purposes   All illustrations and images included in CareNotes® are the copyrighted property of [employer]  or Chris Brizuela   The above information is an  only  It is not intended as medical advice for individual conditions or treatments  Talk to your doctor, nurse or pharmacist before following any medical regimen to see if it is safe and effective for you

## 2021-07-11 NOTE — PROGRESS NOTES
[address] [facility]        NAME: Nigel Metz is a 77 y o  male  : 1955    MRN: [id number]  DATE: 2021  TIME: 9:17 AM    Assessment and Plan   Acute midline low back pain without sciatica [M54 5]  1  Acute midline low back pain without sciatica  lidocaine (LIDODERM) 5 %         Patient Instructions      stretching, moist heat to the affected area  Increase his Flexeril to 3 times a day as needed for the spasm  Take his Celebrex daily until the acute flare  Quiets down  Follow up with PCP in 3-5 days  If symptoms increase or no better    Chief Complaint     Chief Complaint   Patient presents with    Back Pain     Patient presents with l lower back pain for two weeks  He states it got worse yesterday  History of Present Illness        Patient is a history of chronic low back pain for which he takes Celebrex and Flexeril once a day  On a Monday through Friday basis  Over the last 2 weeks he has had increase of his back pain without radiation  It is mainly left-sided  Back Pain  This is a new problem  The current episode started 1 to 4 weeks ago  The problem occurs constantly  The problem has been gradually worsening since onset  The pain is present in the lumbar spine  The quality of the pain is described as aching  The pain does not radiate  The pain is at a severity of 4/10  The pain is worse during the day  The symptoms are aggravated by bending and twisting  Stiffness is present in the morning  Pertinent negatives include no abdominal pain, bladder incontinence, bowel incontinence, chest pain, dysuria, fever, headaches, leg pain, numbness, paresis, paresthesias, pelvic pain, perianal numbness, tingling, weakness or weight loss  Review of Systems   Review of Systems   Constitutional: Negative for fever and weight loss  Cardiovascular: Negative for chest pain  Gastrointestinal: Negative for abdominal pain and bowel incontinence     Genitourinary: Negative for bladder incontinence, dysuria and pelvic pain  Musculoskeletal: Positive for back pain  Neurological: Negative for tingling, weakness, numbness, headaches and paresthesias  All other systems reviewed and are negative          Current Medications       Current Outpatient Medications:     aspirin 325 mg tablet, Take 325 mg by mouth daily, Disp: , Rfl:     Blood Glucose Monitoring Suppl (ONETOUCH VERIO) w/Device KIT, Use to test 2-3x daily, Disp: 1 kit, Rfl: 1    celecoxib (CeleBREX) 200 mg capsule, Take 200 mg by mouth daily, Disp: , Rfl:     cyclobenzaprine (FLEXERIL) 10 mg tablet, 1 tab qd prn, Disp: 90 tablet, Rfl: 0    ezetimibe-simvastatin (VYTORIN) 10-20 mg per tablet, TAKE 1 TABLET BY MOUTH 4 TIMES A WEEK AT 9 AM , Disp: , Rfl:     Invokana 300 MG TABS, , Disp: , Rfl:     lisinopril (ZESTRIL) 10 mg tablet, Take 4 tablets (40 mg total) by mouth daily, Disp: 90 tablet, Rfl: 1    metFORMIN (GLUCOPHAGE) 500 mg tablet, One tab by mouth with breakfast and one tab by mouth with dinner, Disp: 60 tablet, Rfl: 4    NOVOLOG MIX 70/30 FLEXPEN (70-30) 100 units/mL injection pen, INJECT 30 UNITS UNDER THE SKIN 2 (TWO) TIMES A DAY BEFORE MEALS , Disp: , Rfl: 3    tamsulosin (FLOMAX) 0 4 mg, Take 1 capsule (0 4 mg total) by mouth daily with dinner, Disp: 90 capsule, Rfl: 1    cyclobenzaprine (FLEXERIL) 5 mg tablet, TAKE 1 TO 2 TABLETS BY MOUTH 3 TIMES A DAY AS NEEDED FOR MUSCLE SPASMS (Patient not taking: Reported on 7/11/2021), Disp: , Rfl:     glucose blood (OneTouch Verio) test strip, Use 2-3x daily, Disp: 100 each, Rfl: 3    Influenza Virus Vacc Split PF 0 5 ML Erika JUARES [id number](PF) 45 mcg (15 mcg x 3)/0 5 mL intramuscular syringe  inject 0 5 milliliter intramuscularly, Disp: , Rfl:     Insulin Pen Needle 32G X 6 MM MISC, BD Ultra-Fine Mini Pen Needle 31 gauge x 3/16", Disp: , Rfl:     lidocaine (LIDODERM) 5 %, Apply 1 patch topically daily Remove & Discard patch within 12 hours or as directed by MD, Disp: 30 patch, Rfl: 0    OneTouch Delica Lancets 93P MISC, Use to test 2-3x daily, Disp: 100 each, Rfl: 3    silver sulfadiazine (SILVADENE,SSD) 1 % cream, Apply topically daily (Patient not taking: Reported on 7/11/2021), Disp: 50 g, Rfl: 0    Current Allergies     Allergies as of 07/11/2021 - Reviewed 07/11/2021   Allergen Reaction Noted    No known allergies  10/08/2019            The following portions of the patient's history were reviewed and updated as appropriate: allergies, current medications, past family history, past medical history, past social history, past surgical history and problem list      Past Medical History:   Diagnosis Date    Colon polyp     Diabetes mellitus ([address] )     Hypertension        Past Surgical History:   Procedure Laterality Date    COLONOSCOPY      ROTATOR CUFF REPAIR Left        Family History   Problem Relation Age of Onset   Kristal Day Alzheimer's disease Mother     Stomach cancer Father     Cancer Father [de-identified]        stomach    Pancreatic cancer Brother          Medications have been verified  Objective   /74   Pulse 81   Temp 98 °F (36 7 °C)   Ht 6' 6" (1 981 m)   Wt (!) 139 kg (306 lb)   SpO2 98%   BMI 35 36 kg/m²   No LMP for male patient  Physical Exam     Physical Exam  Vitals and nursing note reviewed  Constitutional:       Appearance: Normal appearance  He is obese  Cardiovascular:      Rate and Rhythm: Normal rate and regular rhythm  Pulses: Normal pulses  Heart sounds: Normal heart sounds  Pulmonary:      Effort: Pulmonary effort is normal       Breath sounds: Normal breath sounds  Musculoskeletal:         General: Tenderness ( left L5 through S1 with mild spasm) present  No swelling or deformity  Neurological:      General: No focal deficit present  Mental Status: He is alert  Sensory: No sensory deficit        Deep Tendon Reflexes: Reflexes normal         slight decrease in flexion but full extension and lateral bending    Heel/ toe is normal   Motor 5/5

## 2021-07-15 ENCOUNTER — TELEPHONE (OUTPATIENT)
Dept: ADMINISTRATIVE | Facility: OTHER | Age: 66
End: 2021-07-15

## 2021-07-15 NOTE — TELEPHONE ENCOUNTER
Upon review of the In Basket request we patient had podiatry visit withvaishnavi Ingram  Please Reach out to liasions for further assistance  Any additional questions or concerns should be emailed to the Practice Liaisons via [email or web address] email, please do not reply via In Basket      Thank you  Lopez Wells

## 2021-07-15 NOTE — TELEPHONE ENCOUNTER
----- Message from Blanco Neville, [address] [place] sent at 7/15/2021  9:16 AM EDT -----  Regarding: Care Gap Request  07/15/21 9:16 AM    Hello, our patient Navya Lovell has had Diabetic Foot Exam completed/performed  Please assist in updating the patient chart by pulling the document from the Media Tab  The date of service is 5/20/21 @podiatry       Thank you,  Blanco Neville MA  [facility]

## 2021-07-19 ENCOUNTER — HOSPITAL ENCOUNTER (OUTPATIENT)
Dept: VASCULAR ULTRASOUND | Facility: HOSPITAL | Age: 66
Discharge: HOME | End: 2021-07-19
Attending: PODIATRIST
Payer: COMMERCIAL

## 2021-07-19 DIAGNOSIS — Z86.31 PERSONAL HISTORY OF DIABETIC FOOT ULCER: ICD-10-CM

## 2021-07-19 PROCEDURE — 93925 LOWER EXTREMITY STUDY: CPT | Performed by: SURGERY

## 2021-07-19 PROCEDURE — 93923 UPR/LXTR ART STDY 3+ LVLS: CPT

## 2021-07-19 PROCEDURE — 93922 UPR/L XTREMITY ART 2 LEVELS: CPT | Performed by: SURGERY

## 2021-07-19 PROCEDURE — 93925 LOWER EXTREMITY STUDY: CPT

## 2021-07-30 ENCOUNTER — OFFICE VISIT (OUTPATIENT)
Dept: URGENT CARE | Facility: MEDICAL CENTER | Age: 66
End: 2021-07-30
Payer: COMMERCIAL

## 2021-07-30 VITALS
HEART RATE: 92 BPM | HEIGHT: 78 IN | TEMPERATURE: 97.1 F | WEIGHT: 306 LBS | RESPIRATION RATE: 20 BRPM | OXYGEN SATURATION: 97 % | BODY MASS INDEX: 35.4 KG/M2

## 2021-07-30 DIAGNOSIS — B34.9 VIRAL INFECTION: Primary | ICD-10-CM

## 2021-07-30 PROCEDURE — U0003 INFECTIOUS AGENT DETECTION BY NUCLEIC ACID (DNA OR RNA); SEVERE ACUTE RESPIRATORY SYNDROME CORONAVIRUS 2 (SARS-COV-2) (CORONAVIRUS DISEASE [COVID-19]), AMPLIFIED PROBE TECHNIQUE, MAKING USE OF HIGH THROUGHPUT TECHNOLOGIES AS DESCRIBED BY CMS-2020-01-R: HCPCS | Performed by: PHYSICIAN ASSISTANT

## 2021-07-30 PROCEDURE — 99213 OFFICE O/P EST LOW 20 MIN: CPT | Performed by: PHYSICIAN ASSISTANT

## 2021-07-30 PROCEDURE — U0005 INFEC AGEN DETEC AMPLI PROBE: HCPCS | Performed by: PHYSICIAN ASSISTANT

## 2021-07-30 NOTE — PROGRESS NOTES
[address] [facility]        NAME: Shelly Estrella is a 77 y o  male  : 1955    MRN: [id number]  DATE: 2021  TIME: 1:46 PM    Assessment and Plan   Viral infection [B34 9]  1  Viral infection  Novel Coronavirus (Covid-19),PCR [facility]     Covid testing completed  Patient Instructions       Patient was educated on [place] [address]  Patient was educated to stay hydration  Patient was educated to stay quarantined until results are back  Any chest pain, shortness of breath or high grade fevers patient was told to go to ED  Chief Complaint     Chief Complaint   Patient presents with    Cough     Patient states he and his wife were traveling in [place] and he now has a productive cough, runny nose, and congestion         History of Present Illness       Patient presents today with cough, runny nose and congestion for three days  Patient denies any COVID 19 exposures  Patient is vaccinated with COVID 19 with Moderna  Patient has recently traveled  Review of Systems   Review of Systems   Constitutional: Negative  HENT: Positive for congestion and rhinorrhea  Respiratory: Positive for cough  Genitourinary: Negative  Musculoskeletal: Negative  Neurological: Negative  Psychiatric/Behavioral: Negative            Current Medications       Current Outpatient Medications:     aspirin 325 mg tablet, Take 325 mg by mouth daily, Disp: , Rfl:     celecoxib (CeleBREX) 200 mg capsule, Take 200 mg by mouth daily, Disp: , Rfl:     cyclobenzaprine (FLEXERIL) 10 mg tablet, 1 tab qd prn, Disp: 90 tablet, Rfl: 0    ezetimibe-simvastatin (VYTORIN) 10-20 mg per tablet, TAKE 1 TABLET BY MOUTH 4 TIMES A WEEK AT 9 AM , Disp: , Rfl:     Invokana 300 MG TABS, , Disp: , Rfl:     lisinopril (ZESTRIL) 10 mg tablet, Take 4 tablets (40 mg total) by mouth daily, Disp: 90 tablet, Rfl: 1    metFORMIN (GLUCOPHAGE) 500 mg tablet, One tab by mouth with breakfast and one tab by mouth with dinner, Disp: 60 tablet, Rfl: 4    NOVOLOG MIX 70/30 FLEXPEN (70-30) 100 units/mL injection pen, INJECT 30 UNITS UNDER THE SKIN 2 (TWO) TIMES A DAY BEFORE MEALS , Disp: , Rfl: 3    tamsulosin (FLOMAX) 0 4 mg, Take 1 capsule (0 4 mg total) by mouth daily with dinner, Disp: 90 capsule, Rfl: 1    Blood Glucose Monitoring Suppl (ONETOUCH VERIO) w/Device KIT, Use to test 2-3x daily, Disp: 1 kit, Rfl: 1    cyclobenzaprine (FLEXERIL) 5 mg tablet, TAKE 1 TO 2 TABLETS BY MOUTH 3 TIMES A DAY AS NEEDED FOR MUSCLE SPASMS (Patient not taking: Reported on 7/11/2021), Disp: , Rfl:     glucose blood (OneTouch Verio) test strip, Use 2-3x daily, Disp: 100 each, Rfl: 3    Influenza Virus Vacc Split PF 0 5 ML MORMatthew EVANSuria [id number](PF) 45 mcg (15 mcg x 3)/0 5 mL intramuscular syringe  inject 0 5 milliliter intramuscularly, Disp: , Rfl:     Insulin Pen Needle 32G X 6 MM MISC, BD Ultra-Fine Mini Pen Needle 31 gauge x 3/16", Disp: , Rfl:     lidocaine (LIDODERM) 5 %, Apply 1 patch topically daily Remove & Discard patch within 12 hours or as directed by MD (Patient not taking: Reported on 7/30/2021), Disp: 30 patch, Rfl: 0    OneTouch Delica Lancets 47N MISC, Use to test 2-3x daily, Disp: 100 each, Rfl: 3    silver sulfadiazine (SILVADENE,SSD) 1 % cream, Apply topically daily (Patient not taking: Reported on 7/11/2021), Disp: 50 g, Rfl: 0    Current Allergies     Allergies as of 07/30/2021 - Reviewed 07/30/2021   Allergen Reaction Noted    No known allergies  10/08/2019            The following portions of the patient's history were reviewed and updated as appropriate: allergies, current medications, past family history, past medical history, past social history, past surgical history and problem list      Past Medical History:   Diagnosis Date    Colon polyp     Diabetes mellitus ([facility] [address] )     Hypertension        Past Surgical History:   Procedure Laterality Date    COLONOSCOPY      ROTATOR CUFF REPAIR Left        Family History Problem Relation Age of Onset    Alzheimer's disease Mother     Stomach cancer Father     Cancer Father [de-identified]        stomach    Pancreatic cancer Brother          Medications have been verified  Objective   Pulse 92   Temp (!) 97 1 °F (36 2 °C)   Resp 20   Ht 6' 6 5" (1 994 m)   Wt (!) 139 kg (306 lb)   SpO2 97%   BMI 34 91 kg/m²   No LMP for male patient  Physical Exam     Physical Exam  Constitutional:       Appearance: He is normal weight  HENT:      Head: Normocephalic  Comments: No pain over frontal or maxillary sinus     Right Ear: Tympanic membrane, ear canal and external ear normal       Left Ear: Tympanic membrane, ear canal and external ear normal       Nose: Nose normal       Mouth/Throat:      Mouth: Mucous membranes are moist       Pharynx: No oropharyngeal exudate or posterior oropharyngeal erythema  Eyes:      Extraocular Movements: Extraocular movements intact  Pupils: Pupils are equal, round, and reactive to light  Neck:      Comments: No palpable lymph nodes  Cardiovascular:      Rate and Rhythm: Normal rate and regular rhythm  Heart sounds: Normal heart sounds  Pulmonary:      Breath sounds: Normal breath sounds  Abdominal:      Palpations: Abdomen is soft  Neurological:      General: No focal deficit present  Mental Status: He is alert and oriented to person, place, and time     Psychiatric:         Mood and Affect: Mood normal          Behavior: Behavior normal

## 2021-07-30 NOTE — PATIENT INSTRUCTIONS
Patient was educated on [place] [facility]  Patient was educated to stay hydration  Patient was educated to stay quarantined until results are back  Any chest pain, shortness of breath or high grade fevers patient was told to go to ED  COVID-19 (Coronavirus Disease 2019)   WHAT YOU NEED TO KNOW:   Coronavirus disease 2019 (COVID-19) is the disease caused by a coronavirus first discovered in December 2019  Coronaviruses generally cause upper respiratory (nose, throat, and lung) infections, such as a cold  The new virus spreads quickly and easily  The virus can be spread starting 2 days before symptoms even begin  The virus has also changed into several new forms (called variants) since it was discovered  The variants may be more contagious (easily spread) than the original form  Some may also cause more severe illness than others  It is important to follow local, national, and worldwide measures to protect yourself and others from infection  DISCHARGE INSTRUCTIONS:   If you think you or someone you know may be infected:  Do the following to protect others:  · If emergency care is needed,  tell the  about the possible infection, or call ahead and tell the emergency department  · Call a healthcare provider  for instructions if symptoms are mild  Anyone who may be infected should not  arrive without calling first  The provider will need to protect staff members and other patients  · The person who may be infected needs to wear a face covering  while getting medical care  This will help lower the risk of infecting others  Coverings are not used for anyone who is younger than 2 years, has breathing problems, or cannot remove it  The provider can give you instructions for anyone who cannot wear a covering      Call your local emergency number (911 in the [address] [place] [address],[address]) or an emergency department if:   · You have trouble breathing or shortness of breath at rest     · You have chest pain or pressure that lasts longer than 5 minutes  · You become confused or hard to wake  · Your lips or face are blue  · You have a fever of 104°F (40°C) or higher  Call your doctor if:   · You do not  have symptoms of COVID-19 but had close physical contact within 14 days with someone who tested positive  · You have questions or concerns about your condition or care  Medicines: You may need any of the following for mild symptoms:  · Decongestants  help reduce nasal congestion and help you breathe more easily  If you take decongestant pills, they may make you feel restless or cause problems with your sleep  Do not use decongestant sprays for more than a few days  · Cough suppressants  help reduce coughing  Ask your healthcare provider which type of cough medicine is best for you  · Acetaminophen  decreases pain and fever  It is available without a doctor's order  Ask how much to take and how often to take it  Follow directions  Read the labels of all other medicines you are using to see if they also contain acetaminophen, or ask your doctor or pharmacist  Acetaminophen can cause liver damage if not taken correctly  Do not use more than 4 grams (4,000 milligrams) total of acetaminophen in one day  · NSAIDs , such as ibuprofen, help decrease swelling, pain, and fever  NSAIDs can cause stomach bleeding or kidney problems in certain people  If you take blood thinner medicine, always ask your healthcare provider if NSAIDs are safe for you  Always read the medicine label and follow directions  · Take your medicine as directed  Contact your healthcare provider if you think your medicine is not helping or if you have side effects  Tell him or her if you are allergic to any medicine  Keep a list of the medicines, vitamins, and herbs you take  Include the amounts, and when and why you take them  Bring the list or the pill bottles to follow-up visits  Carry your medicine list with you in case of an emergency      How the 2019 coronavirus spreads: The following are ways the virus is thought to spread, but more information may be coming:  · Droplets are the main way all coronaviruses spread  The virus travels in droplets that form when a person talks, coughs, or sneezes  The droplets can also float in the air for minutes or hours  Infection happens when you breathe in the droplets or get them in your eyes or nose  Close personal contact with an infected person increases your risk for infection  This means being within 6 feet (2 meters) of the person for at least 15 minutes over 24 hours  · Person-to-person contact can spread the virus  For example, a person with the virus on his or her hands can spread it by shaking hands with someone  · The virus can stay on objects and surfaces for a short time  You may become infected by touching the object or surface and then touching your eyes or mouth  · An infected animal may be able to infect a person who touches it  This may happen at live markets or on a farm  Help lower the risk for COVID-19:  The best way to prevent infection is to avoid anyone who is infected, but this can be hard to do  An infected person can spread the virus before signs or symptoms begin, or even if signs or symptoms never develop  The following can help lower the risk for infection:      · Wash your hands often throughout the day  Use soap and water  Rub your soapy hands together, lacing your fingers, for at least 20 seconds  Rinse with warm, running water  Dry your hands with a clean towel or paper towel  Use hand  that contains alcohol if soap and water are not available  Teach children how to wash their hands and use hand   · Cover sneezes and coughs  Turn your face away and cover your mouth and nose with a tissue  Throw the tissue away  Use the bend of your arm if a tissue is not available  Then wash your hands well with soap and water or use hand    Teach children how to cover a cough or sneeze  · Wear a face covering (mask) around anyone who does not live in your home  Use a cloth covering with at least 2 layers  You can also create layers by putting a cloth covering over a disposable non-medical mask  Cover your mouth and your nose  The covering should fit snugly against the bridge of your nose  Securely fasten it under your chin and on the sides of your face  Do not  wear a plastic face shield instead of a covering  Continue social distancing and washing your hands often  A face covering is not a substitute for social distancing safety measures  · Follow worldwide, national, and local social distancing guidelines  Keep at least 6 feet (2 meters) between you and others  Also keep this distance from anyone who comes to your home, such as someone making a delivery  Wear a face covering while you are around others  You will need to wear a covering in restaurants, stores, and other public buildings  You will also need a covering on mass transit, such as a bus, subway, or airplane  Remember to use a covering made from thick material or wear 2 coverings together  · Make a habit of not touching your face  If you get the virus on your hands, you can transfer it to your eyes, nose, or mouth and become infected  You can also transfer it to objects, surfaces, or people  Do not touch your eyes, nose, or mouth without washing your hands first     · Clean and disinfect high-touch surfaces and objects often  Use disinfecting wipes, or make a solution of 4 teaspoons of bleach in 1 quart (4 cups) of water  Clean and disinfect even if you think no one living in or coming to your home is infected with the virus  · Ask about vaccines you may need  Get a COVID-19 vaccine when it is available to you  The current vaccines are given as a shot in 1 or 2 doses  Get the influenza (flu) vaccine as soon as recommended each year, usually starting in September or October   Get the pneumonia vaccine if recommended  Follow social distancing guidelines:  National and local social distancing rules vary  Rules may change over time as restrictions are lifted  Restrictions may return if an outbreak happens where you live  It is important to know and follow all current social distancing rules in your area  The following are general guidelines:  · Stay home if you are sick or think you may have COVID-19  It is important to stay home if you are waiting for a testing appointment or for test results  Even if you do not have symptoms, you can pass the virus to others  · Limit trips out of your home  Have food, medicines, and other supplies delivered and left at your door or other area, if possible  Plan trips out of your home so you make the fewest stops possible to limit close personal contact  Keep track of places you go  This will help contact tracers notify others if you become infected  · Avoid close physical contact with anyone who does not live in your home  Do not shake hands with, hug, or kiss a person as a greeting  If you must use public transportation (such as a bus or subway), try to sit or stand away from others  Only allow necessary people into your home  Wear your face covering, and remind others to wear a face covering  Remind them to wash their hands when they arrive and before they leave  Do not  let someone into your home or go to someone's home just to visit  Even if you both do not feel sick, the virus can pass from one of you to the other  · Avoid in-person gatherings and crowds  Gatherings or crowds of 10 or more individuals can cause the virus to spread  Avoid places such as duque, beaches, sporting events, and tourist attractions  For events such as parties, holiday meals, Latter day services, and conferences, attend virtually (on a computer), if possible  · Ask your healthcare provider for other ways to have appointments    Some providers offer phone, video, or other types of appointments  You may also be able to get prescriptions for a few months of your medicines at a time  · Stay safe if you must go out to work  Keep physical distance between you and other workers as much as possible  Follow your employer's rules so everyone stays safe  If you have COVID-19 and are recovering at home,  healthcare providers will give you specific instructions to follow  The following are general guidelines to remind you how to keep others safe until you are well:  · Wash your hands often  Use soap and water as much as possible  Use hand  that contains alcohol if soap and water are not available  Dry your hands with a clean towel or paper towel  Do not share towels with anyone  If you use paper towels, throw them away in a lined trash can kept in your room or area  Use a covered trash can, if possible  · Do not go out of your home unless it is necessary  Ask someone who is not infected to go out for groceries or supplies, or have them delivered  Do not go to your healthcare provider's office without an appointment  · Only have close physical contact with a person giving direct care, or a baby or child you must care for  Family members and friends should not visit you  If possible, stay in a separate area or room of your home if you live with others  No one should go into the area or room except to give you care  You can visit with others by phone, video chat, e-mail, or similar systems  · Wear a face covering while others are near you  This can help prevent droplets from spreading the virus when you talk, sneeze, or cough  Put the covering on before anyone comes into your room or area  Remind the person to cover his or her nose and mouth before coming in to provide care for you  · Do not share items  Do not share dishes, towels, or other items with anyone  Items need to be washed after you use them  · Protect your baby    Some newborns have tested positive for the virus  It is not known if they became infected before or after birth  The highest risk is when a  has close contact with an infected person  If you are pregnant or breastfeeding, talk to your healthcare provider or obstetrician about any concerns you have  He or she will tell you when to bring your baby in for check-ups and vaccines  He or she will also tell you what to do if you think your baby was infected with the coronavirus  Wash your hands and put on a clean face covering before you breastfeed or care for your baby  · Do not handle live animals unless it is necessary  Some animals, including pets, have been infected with the new coronavirus  Ask someone who is not infected to take care of your pet until you are well  If you must care for a pet, wear a face covering  Wash your hands before and after you give care  Talk to your healthcare provider about how to keep a service animal safe, if needed  · Follow directions from your healthcare provider for being around others after you recover  It is not known if or for how long a recovered person can pass the virus to others  Your provider may give you instructions, such as continuing social distancing and wearing a face covering  He or she will tell you when it is okay to be around others again  This may be 10 to 20 days after symptoms started or you had a positive test  Most symptoms will also need to be gone  Your provider will give you more information  Follow up with your doctor as directed:  Write down your questions so you remember to ask them during your visits  For more information:   · Centers for Disease Control and Prevention  [address] , [address]  Phone: 4- 359 - 578-6894  Web Address: [employer]    © [address] [place] [address]  Information is for End User's use only and may not be sold, redistributed or otherwise used for commercial purposes   All illustrations and images included in CareNotes® are the copyrighted property of [employer]  or Chris Brizuela   The above information is an  only  It is not intended as medical advice for individual conditions or treatments  Talk to your doctor, nurse or pharmacist before following any medical regimen to see if it is safe and effective for you

## 2021-07-31 LAB — SARS-COV-2 RNA RESP QL NAA+PROBE: NEGATIVE

## 2021-08-03 ENCOUNTER — TELEPHONE (OUTPATIENT)
Dept: URGENT CARE | Facility: MEDICAL CENTER | Age: 66
End: 2021-08-03

## 2021-08-04 ENCOUNTER — OFFICE VISIT (OUTPATIENT)
Dept: PODIATRY | Facility: CLINIC | Age: 66
End: 2021-08-04
Payer: COMMERCIAL

## 2021-08-04 VITALS
DIASTOLIC BLOOD PRESSURE: 80 MMHG | WEIGHT: 302 LBS | HEART RATE: 83 BPM | SYSTOLIC BLOOD PRESSURE: 142 MMHG | BODY MASS INDEX: 34.46 KG/M2

## 2021-08-04 DIAGNOSIS — M20.42 HAMMERTOES OF BOTH FEET: ICD-10-CM

## 2021-08-04 DIAGNOSIS — E11.42 DIABETIC POLYNEUROPATHY ASSOCIATED WITH TYPE 2 DIABETES MELLITUS (HCC): ICD-10-CM

## 2021-08-04 DIAGNOSIS — L84 CORNS AND CALLOSITIES: Primary | ICD-10-CM

## 2021-08-04 DIAGNOSIS — M20.41 HAMMERTOES OF BOTH FEET: ICD-10-CM

## 2021-08-04 DIAGNOSIS — Z86.31 PERSONAL HISTORY OF DIABETIC FOOT ULCER: ICD-10-CM

## 2021-08-04 PROBLEM — L97.529 DIABETIC ULCER OF LEFT FOOT ASSOCIATED WITH DIABETES MELLITUS DUE TO UNDERLYING CONDITION (HCC): Status: RESOLVED | Noted: 2021-06-01 | Resolved: 2021-08-04

## 2021-08-04 PROBLEM — E08.621 DIABETIC ULCER OF LEFT FOOT ASSOCIATED WITH DIABETES MELLITUS DUE TO UNDERLYING CONDITION (HCC): Status: RESOLVED | Noted: 2021-06-01 | Resolved: 2021-08-04

## 2021-08-04 PROCEDURE — 1036F TOBACCO NON-USER: CPT | Performed by: PODIATRIST

## 2021-08-04 PROCEDURE — 99214 OFFICE O/P EST MOD 30 MIN: CPT | Performed by: PODIATRIST

## 2021-08-04 PROCEDURE — 11055 PARING/CUTG B9 HYPRKER LES 1: CPT | Performed by: PODIATRIST

## 2021-08-04 PROCEDURE — 1160F RVW MEDS BY RX/DR IN RCRD: CPT | Performed by: PODIATRIST

## 2021-08-04 NOTE — PROGRESS NOTES
This patient was seen on 8/3/21  My role is Foot , Ankle, and Wound Specialist    SUBJECTIVE    Chief Complaint:  Hammertoes Left foot     Patient ID: Hi Wu is a 77 y o  male  Pt presents with prior wound that is no longer draining  Pt arrives with dry dressing intact  He wears an open surgical shoe  He completed the arterial doppler that we ordered in preparation for possible Left foot surgery  He also notes a non-draining callous Right foot  The following portions of the patient's history were reviewed and updated as appropriate: allergies, current medications, past family history, past medical history, past social history, past surgical history and problem list     Review of Systems   Constitutional: Negative  Respiratory: Negative  Cardiovascular: Negative  Gastrointestinal: Negative  Neurological: Positive for numbness  OBJECTIVE      /80   Pulse 83   Wt (!) 137 kg (302 lb)   BMI 34 46 kg/m²     Foot/Ankle Musculoskeletal Exam    General        Constitutional: well-developed      General additional comments: Hammertoes 2, 3 bilateral feet  Mild HAV bilateral feet  Neurovascular      Neurovascular - Right        Dorsalis pedis: 2+      Posterior tibial: 2+      Neurovascular - Left        Dorsalis pedis: 2+      Posterior tibial: 2+       Physical Exam  Constitutional:       General: He is not in acute distress  Appearance: He is well-developed  He is not diaphoretic  HENT:      Head: Normocephalic  Eyes:      Pupils: Pupils are equal, round, and reactive to light  Cardiovascular:      Pulses:           Dorsalis pedis pulses are 2+ on the right side and 2+ on the left side  Posterior tibial pulses are 2+ on the right side and 2+ on the left side  Pulmonary:      Effort: Pulmonary effort is normal       Breath sounds: Normal breath sounds  Abdominal:      General: Bowel sounds are normal       Palpations: Abdomen is soft  Musculoskeletal:      Cervical back: Normal range of motion  Comments: Hammertoes 2, 3 bilateral feet  Mild HAV bilateral feet  Feet:      Right foot:      Protective Sensation: 10 sites tested  2 sites sensed  Skin integrity: Ulcer present  Left foot:      Protective Sensation: 10 sites tested  3 sites sensed  Skin:     Comments: Callous Right foot plantar  No ulcer  ASSESSMENT     Diagnoses and all orders for this visit:    Personal history of diabetic foot ulcer    Diabetic polyneuropathy associated with type 2 diabetes mellitus ([facility] )    Hammertoes of both feet         Problem List Items Addressed This Visit        Endocrine    Diabetic polyneuropathy associated with type 2 diabetes mellitus ([facility] )       Musculoskeletal and Integument    Hammertoes of both feet       Other    Personal history of diabetic foot ulcer - Primary            PLAN    I personally reviewed the arterial doppler results of the lower extremities dated 7/19/21 noting normal great toe pressures and MAT's  I feel he would do well with simple arthroplasties toes 2,3,4 Left and a simple bunionectomy with an Akin osteotomy  I described the surgery in depth including the post-op recovery and possible risks  He wants to delay the surgery until after a fishing trip in September  I recommended he continue wearing the surgical shoe until surgery to prevent any re-ulceration of the foot  I removed the callous from the contralateral foot  Lesion Destruction    Date/Time: 8/4/2021 1:02 PM  Performed by: George Dwyer DPM  Authorized by: George Dwyer DPM   Universal Protocol:  Consent: Verbal consent obtained  Risks and benefits: risks, benefits and alternatives were discussed  Consent given by: patient  Time out: Immediately prior to procedure a "time out" was called to verify the correct patient, procedure, equipment, support staff and site/side marked as required    Timeout called at: 8/4/2021 1:02 PM   Patient understanding: patient states understanding of the procedure being performed  Patient identity confirmed: verbally with patient      Procedure Details - Lesion Destruction:     Number of Lesions:  1  Lesion 1:     Body area:  Lower extremity    Lower extremity location:  R foot    Malignancy: benign hyperkeratotic lesion      Destruction method: scissors used for extraction

## 2021-08-09 ENCOUNTER — RA CDI HCC (OUTPATIENT)
Dept: OTHER | Facility: HOSPITAL | Age: 66
End: 2021-08-09

## 2021-08-09 NOTE — PROGRESS NOTES
[facility]          Chart reviewed, no opportunity found: CHART REVIEWED, NO OPPORTUNITY FOUND                     Patients insurance company:  [employer] [facility] (Medicare Advantage and Commercial)

## 2021-08-12 PROCEDURE — 3061F NEG MICROALBUMINURIA REV: CPT | Performed by: PODIATRIST

## 2021-08-24 ENCOUNTER — RA CDI HCC (OUTPATIENT)
Dept: OTHER | Facility: HOSPITAL | Age: 66
End: 2021-08-24

## 2021-08-24 NOTE — PROGRESS NOTES
[facility]       Chart reviewed, no opportunity found: CHART REVIEWED, NO OPPORTUNITY FOUND                        Patients insurance company:  [facility] (Medicare Advantage and Commercial)

## 2021-08-31 ENCOUNTER — OFFICE VISIT (OUTPATIENT)
Dept: FAMILY MEDICINE CLINIC | Facility: CLINIC | Age: 66
End: 2021-08-31
Payer: COMMERCIAL

## 2021-08-31 VITALS
SYSTOLIC BLOOD PRESSURE: 134 MMHG | BODY MASS INDEX: 34.41 KG/M2 | RESPIRATION RATE: 18 BRPM | DIASTOLIC BLOOD PRESSURE: 88 MMHG | HEART RATE: 92 BPM | OXYGEN SATURATION: 97 % | TEMPERATURE: 98.5 F | WEIGHT: 301.6 LBS

## 2021-08-31 DIAGNOSIS — Z79.4 TYPE 2 DIABETES MELLITUS WITHOUT COMPLICATION, WITH LONG-TERM CURRENT USE OF INSULIN (HCC): Primary | ICD-10-CM

## 2021-08-31 DIAGNOSIS — E78.2 MIXED HYPERLIPIDEMIA: ICD-10-CM

## 2021-08-31 DIAGNOSIS — I10 ESSENTIAL HYPERTENSION: ICD-10-CM

## 2021-08-31 DIAGNOSIS — E11.9 TYPE 2 DIABETES MELLITUS WITHOUT COMPLICATION, WITH LONG-TERM CURRENT USE OF INSULIN (HCC): Primary | ICD-10-CM

## 2021-08-31 PROCEDURE — 99214 OFFICE O/P EST MOD 30 MIN: CPT | Performed by: FAMILY MEDICINE

## 2021-08-31 NOTE — PROGRESS NOTES
Assessment/Plan:  Patient is a 77year old male seen for follow up of diabetes  He has lost ten pounds  His A1C has improved slightly from 8 9 to 8 4  It had been 12 a year ago  He is not spilling any protein in his urine  His chol is 144  Having surgery with Dr Yaya Peralta in October  He will continue present medication and see me in four months  Diagnoses and all orders for this visit:    Type 2 diabetes mellitus without complication, with long-term current use of insulin (HCC)  -     metFORMIN (GLUCOPHAGE) 500 mg tablet; Two tab by mouth with breakfast and one tab by mouth with dinner  -     HEMOGLOBIN A1C W/ EAG ESTIMATION; Future  -     Comprehensive metabolic panel; Future    Essential hypertension    Mixed hyperlipidemia          Subjective:   Chief Complaint   Patient presents with    Follow-up     3 month check        Patient ID: Manisha Vale is a 77 y o  male  Patient is here for follow up of blood sugar  He is having foot surgery on October 15       The following portions of the patient's history were reviewed and updated as appropriate: allergies, current medications, past family history, past medical history, past social history, past surgical history and problem list     Review of Systems   Constitutional: Negative for chills and fever  HENT: Negative for congestion and sore throat  Respiratory: Negative for chest tightness  Cardiovascular: Negative for chest pain and palpitations  Gastrointestinal: Negative for abdominal pain, constipation, diarrhea and nausea  Genitourinary: Negative for difficulty urinating  Skin: Negative  Neurological: Negative for dizziness and headaches  Psychiatric/Behavioral: Negative            Objective:      /88 (BP Location: Left arm, Patient Position: Sitting)   Pulse 92   Temp 98 5 °F (36 9 °C) (Tympanic)   Resp 18   Wt (!) 137 kg (301 lb 9 6 oz)   SpO2 97%   BMI 34 41 kg/m²          Physical Exam  Vitals and nursing note reviewed  Constitutional:       General: He is not in acute distress  Appearance: He is obese  HENT:      Head: Normocephalic  Neck:      Thyroid: No thyromegaly  Vascular: No carotid bruit  Cardiovascular:      Rate and Rhythm: Normal rate and regular rhythm  Heart sounds: Normal heart sounds  Comments: /80  Pulmonary:      Effort: Pulmonary effort is normal       Breath sounds: Normal breath sounds  Musculoskeletal:      Right lower leg: No edema  Left lower leg: No edema  Lymphadenopathy:      Cervical: No cervical adenopathy  Skin:     General: Skin is warm and dry  Neurological:      Mental Status: He is alert and oriented to person, place, and time     Psychiatric:         Mood and Affect: Mood normal

## 2021-09-14 DIAGNOSIS — I10 ESSENTIAL HYPERTENSION: ICD-10-CM

## 2021-09-14 PROCEDURE — 4010F ACE/ARB THERAPY RXD/TAKEN: CPT | Performed by: PODIATRIST

## 2021-09-14 RX ORDER — LISINOPRIL 10 MG/1
TABLET ORAL
Qty: 30 TABLET | Refills: 5 | Status: SHIPPED | OUTPATIENT
Start: 2021-09-14 | End: 2022-04-25

## 2021-09-15 ENCOUNTER — OFFICE VISIT (OUTPATIENT)
Dept: PODIATRY | Facility: CLINIC | Age: 66
End: 2021-09-15
Payer: COMMERCIAL

## 2021-09-15 VITALS
DIASTOLIC BLOOD PRESSURE: 70 MMHG | HEART RATE: 81 BPM | HEIGHT: 78 IN | WEIGHT: 304 LBS | SYSTOLIC BLOOD PRESSURE: 123 MMHG | BODY MASS INDEX: 35.17 KG/M2

## 2021-09-15 DIAGNOSIS — M20.41 HAMMERTOES OF BOTH FEET: ICD-10-CM

## 2021-09-15 DIAGNOSIS — M21.612 BUNION, LEFT: ICD-10-CM

## 2021-09-15 DIAGNOSIS — Z01.818 PREOP TESTING: Primary | ICD-10-CM

## 2021-09-15 DIAGNOSIS — M20.42 HAMMERTOES OF BOTH FEET: ICD-10-CM

## 2021-09-15 PROCEDURE — 1036F TOBACCO NON-USER: CPT | Performed by: PODIATRIST

## 2021-09-15 PROCEDURE — 3008F BODY MASS INDEX DOCD: CPT | Performed by: PODIATRIST

## 2021-09-15 PROCEDURE — 99214 OFFICE O/P EST MOD 30 MIN: CPT | Performed by: PODIATRIST

## 2021-09-15 PROCEDURE — 1160F RVW MEDS BY RX/DR IN RCRD: CPT | Performed by: PODIATRIST

## 2021-09-15 RX ORDER — CHLORHEXIDINE GLUCONATE 0.12 MG/ML
15 RINSE ORAL ONCE
Status: CANCELLED | OUTPATIENT
Start: 2021-10-15 | End: 2021-09-15

## 2021-09-15 RX ORDER — CEFAZOLIN SODIUM 2 G/50ML
2000 SOLUTION INTRAVENOUS ONCE
Status: CANCELLED | OUTPATIENT
Start: 2021-10-15 | End: 2021-09-15

## 2021-09-15 NOTE — PROGRESS NOTES
This patient was seen on 9/15/21  My role is Foot , Ankle, and Wound Specialist    SUBJECTIVE    Chief Complaint:  Toe deformities     Patient ID: Paxton Adams is a 77 y o  male  Colleen Aviles is here to schedule surgery on the Left foot  The purpose is to straighten his toes to prevent future toe ulcer recurrence  The following portions of the patient's history were reviewed and updated as appropriate: allergies, current medications, past family history, past medical history, past social history, past surgical history and problem list     Review of Systems   Constitutional: Negative  Respiratory: Negative  Cardiovascular: Negative  Gastrointestinal: Negative  Neurological: Positive for numbness  OBJECTIVE      /70   Pulse 81   Ht 6' 6" (1 981 m)   Wt (!) 138 kg (304 lb)   BMI 35 13 kg/m²     Foot/Ankle Musculoskeletal Exam    General        Constitutional: well-developed      General additional comments: Hammertoes 2, 3 bilateral feet  Mild HAV bilateral feet  Neurovascular      Neurovascular - Right        Dorsalis pedis: 2+      Posterior tibial: 2+      Neurovascular - Left        Dorsalis pedis: 2+      Posterior tibial: 2+       Physical Exam  Constitutional:       General: He is not in acute distress  Appearance: He is well-developed  He is not diaphoretic  HENT:      Head: Normocephalic  Eyes:      Pupils: Pupils are equal, round, and reactive to light  Cardiovascular:      Pulses:           Dorsalis pedis pulses are 2+ on the right side and 2+ on the left side  Posterior tibial pulses are 2+ on the right side and 2+ on the left side  Pulmonary:      Effort: Pulmonary effort is normal       Breath sounds: Normal breath sounds  Abdominal:      General: Bowel sounds are normal       Palpations: Abdomen is soft  Musculoskeletal:      Cervical back: Normal range of motion  Comments: Hammertoes 2, 3 bilateral feet  Mild HAV bilateral feet  Feet:      Right foot:      Protective Sensation: 10 sites tested  2 sites sensed  Skin integrity: No ulcer  Left foot:      Protective Sensation: 10 sites tested  3 sites sensed  Skin:     Comments: Callous Right foot plantar  No ulcer  ASSESSMENT     Diagnoses and all orders for this visit:    Preop testing  -     Case request operating room: REPAIR HAMMERTOE / MALLET TOE / CLAW TOE - toes 2,3,4, BUNIONECTOMY - AKIN with staple; Standing  -     Ambulatory referral to [facility]; Future  -     CBC and differential; Future  -     Basic metabolic panel; Future  -     Case request operating room: REPAIR HAMMERTOE / MALLET TOE / CLAW TOE - toes 2,3,4, BUNIONECTOMY - AKIN with staple    Hammertoes of both feet  -     Case request operating room: REPAIR HAMMERTOE / MALLET TOE / CLAW TOE - toes 2,3,4, BUNIONECTOMY - AKIN with staple; Standing  -     Ambulatory referral to [facility]; Future  -     CBC and differential; Future  -     Basic metabolic panel; Future  -     Case request operating room: REPAIR HAMMERTOE / MALLET TOE / CLAW TOE - toes 2,3,4, BUNIONECTOMY - AKIN with staple    Bunion, left  -     Case request operating room: REPAIR HAMMERTOE / MALLET TOE / CLAW TOE - toes 2,3,4, BUNIONECTOMY - AKIN with staple; Standing  -     Ambulatory referral to [facility]; Future  -     CBC and differential; Future  -     Basic metabolic panel; Future  -     Case request operating room: REPAIR HAMMERTOE / MALLET TOE / CLAW TOE - toes 2,3,4, BUNIONECTOMY - AKIN with staple    Other orders  -     Void on call to OR; Standing  -     Insert peripheral IV; Standing  -     Diet NPO; Sips with meds; Standing  -     chlorhexidine (PERIDEX) 0 12 % oral rinse 15 mL  -     Nursing Communication Swab both nares with Povidone-Iodine solution, EXCLUDE if patient has shellfish/Iodine allergy   Routine, day of surgery, on call to OR; Standing  -     Electrocardiogram, 12-lead; Standing  - ceFAZolin (ANCEF) 2,000 mg in dextrose 5 % 100 mL IVPB         Problem List Items Addressed This Visit        Musculoskeletal and Integument    Hammertoes of both feet    Relevant Orders    Case request operating room: REPAIR HAMMERTOE / MALLET TOE / CLAW TOE - toes 2,3,4, BUNIONECTOMY - AKIN with staple (Completed)    Ambulatory referral to Family Practice    CBC and differential    Basic metabolic panel    Bunion, left    Relevant Orders    Case request operating room: REPAIR HAMMERTOE / MALLET TOE / CLAW TOE - toes 2,3,4, BUNIONECTOMY - AKIN with staple (Completed)    Ambulatory referral to Family Practice    CBC and differential    Basic metabolic panel      Other Visit Diagnoses     Preop testing    -  Primary    Relevant Orders    Case request operating room: REPAIR HAMMERTOE / MALLET TOE / CLAW TOE - toes 2,3,4, BUNIONECTOMY - AKIN with staple (Completed)    Ambulatory referral to Family Practice    CBC and differential    Basic metabolic panel          PLAN    I was very clear at the beginning of the discussion about alternatives to this surgery including benign neglect, bracing, and second surgical opinions  I spent time to discuss with the patient the surgical procedure(s) as Carmine bunionectomy to straighten the hallux and arthroplasty toes 2,3,4 to relieve dorsal toe pressure in shoes, pre-op testing, and post-op course (4 weeks in a surgical shoe)required to properly heal the surgery  I discussed risks as infection, scar, swelling, chronic pain, painful or prominent hardware, possible need to remove hardware, poor healing of incision or bone that could require more surgery, incomplete correction of deformities or recurrence of deformities, change in shape of foot, toe, or walking and function, numbness, neuritis/RSD, blood clots in the leg or lung, and even death from anesthesia complications   No guarantees were given and the possibility of recurrent deformity or incomplete correction were discussed before patient signed the consent form  We also discussed the need for possible anticoagulation  The offloading device necessary after the surgery will be a surgical shoe  The surgery, history and physical and PATS will be scheduled

## 2021-09-22 ENCOUNTER — OFFICE VISIT (OUTPATIENT)
Dept: URGENT CARE | Facility: CLINIC | Age: 66
End: 2021-09-22
Payer: COMMERCIAL

## 2021-09-22 VITALS
RESPIRATION RATE: 18 BRPM | HEART RATE: 98 BPM | DIASTOLIC BLOOD PRESSURE: 77 MMHG | OXYGEN SATURATION: 98 % | TEMPERATURE: 96 F | SYSTOLIC BLOOD PRESSURE: 140 MMHG

## 2021-09-22 DIAGNOSIS — H00.011 HORDEOLUM OF RIGHT UPPER EYELID, UNSPECIFIED HORDEOLUM TYPE: Primary | ICD-10-CM

## 2021-09-22 PROCEDURE — 99213 OFFICE O/P EST LOW 20 MIN: CPT | Performed by: PHYSICIAN ASSISTANT

## 2021-09-22 RX ORDER — TOBRAMYCIN 3 MG/ML
1 SOLUTION/ DROPS OPHTHALMIC 4 TIMES DAILY
Qty: 5 ML | Refills: 0 | Status: SHIPPED | OUTPATIENT
Start: 2021-09-22 | End: 2021-09-29

## 2021-09-22 NOTE — PATIENT INSTRUCTIONS
Stye   WHAT YOU NEED TO KNOW:   A stye is a lump on the edge or inside of your eyelid caused by an infection  A stye can form on your upper or lower eyelid  It usually goes away in 2 to 4 days  DISCHARGE INSTRUCTIONS:   Medicines:   · Antibiotic medicine: This is given as an ointment to put into your eye  It is used to fight an infection caused by bacteria  Use as directed  · Take your medicine as directed  Contact your healthcare provider if you think your medicine is not helping or if you have side effects  Tell him of her if you are allergic to any medicine  Keep a list of the medicines, vitamins, and herbs you take  Include the amounts, and when and why you take them  Bring the list or the pill bottles to follow-up visits  Carry your medicine list with you in case of an emergency  Follow up with your healthcare provider as directed:  Write down your questions so you remember to ask them during your visits  Self-care:   · Use warm compresses: This will help decrease swelling and pain  Wet a clean washcloth with warm water and place it on your eye for 10 to 15 minutes, 3 to 4 times each day or as directed  · Keep your hands away from your eye: This helps to prevent the spread of the infection to other parts of the eye  Wash your hands often with soap and dry with a clean towel  Do not squeeze the stye  · Do not use eye makeup:  Do not wear eye makeup while you have a stye  Eye makeup may carry bacteria and cause another stye  Throw away eye makeup and brushes used to apply the makeup  Use new eye makeup after the stye has gone away  Do not share eye makeup with others  · Prevent another stye:  Wash your face and clean your eyelashes every day  Remove eye makeup with makeup remover  This helps to completely remove eye makeup without heavy rubbing  Contact your healthcare provider if:   · You have redness and discharge around your eye, and your eye pain is getting worse      · Your vision changes  · The stye has not gone away within 7 days  · The stye comes back within a short period of time after treatment  · You have questions or concerns about your condition or care  © Copyright [employer] 2021 Information is for End User's use only and may not be sold, redistributed or otherwise used for commercial purposes  All illustrations and images included in CareNotes® are the copyrighted property of A D A M , Inc  or [facility] Linda Brizuela   The above information is an  only  It is not intended as medical advice for individual conditions or treatments  Talk to your doctor, nurse or pharmacist before following any medical regimen to see if it is safe and effective for you

## 2021-09-22 NOTE — PROGRESS NOTES
[facility]    NAME: Cyn Christy is a 77 y o  male  : 1955    MRN: [id number]  DATE: 2021  TIME: 7:28 PM    Assessment and Plan   Hordeolum of right upper eyelid, unspecified hordeolum type [H00 011]  1  Hordeolum of right upper eyelid, unspecified hordeolum type  tobramycin (Tobrex) 0 3 % SOLN       Patient Instructions   Patient Instructions   Stye   WHAT YOU NEED TO KNOW:   A stye is a lump on the edge or inside of your eyelid caused by an infection  A stye can form on your upper or lower eyelid  It usually goes away in 2 to 4 days  DISCHARGE INSTRUCTIONS:   Medicines:   · Antibiotic medicine: This is given as an ointment to put into your eye  It is used to fight an infection caused by bacteria  Use as directed  · Take your medicine as directed  Contact your healthcare provider if you think your medicine is not helping or if you have side effects  Tell him of her if you are allergic to any medicine  Keep a list of the medicines, vitamins, and herbs you take  Include the amounts, and when and why you take them  Bring the list or the pill bottles to follow-up visits  Carry your medicine list with you in case of an emergency  Follow up with your healthcare provider as directed:  Write down your questions so you remember to ask them during your visits  Self-care:   · Use warm compresses: This will help decrease swelling and pain  Wet a clean washcloth with warm water and place it on your eye for 10 to 15 minutes, 3 to 4 times each day or as directed  · Keep your hands away from your eye: This helps to prevent the spread of the infection to other parts of the eye  Wash your hands often with soap and dry with a clean towel  Do not squeeze the stye  · Do not use eye makeup:  Do not wear eye makeup while you have a stye  Eye makeup may carry bacteria and cause another stye  Throw away eye makeup and brushes used to apply the makeup   Use new eye makeup after the stye has gone away  Do not share eye makeup with others  · Prevent another stye:  Wash your face and clean your eyelashes every day  Remove eye makeup with makeup remover  This helps to completely remove eye makeup without heavy rubbing  Contact your healthcare provider if:   · You have redness and discharge around your eye, and your eye pain is getting worse  · Your vision changes  · The stye has not gone away within 7 days  · The stye comes back within a short period of time after treatment  · You have questions or concerns about your condition or care  © Copyright [employer] 2021 Information is for End User's use only and may not be sold, redistributed or otherwise used for commercial purposes  All illustrations and images included in CareNotes® are the copyrighted property of [employer] A M [employer] Inc  or Chris Brizuela   The above information is an  only  It is not intended as medical advice for individual conditions or treatments  Talk to your doctor, nurse or pharmacist before following any medical regimen to see if it is safe and effective for you  Chief Complaint     Chief Complaint   Patient presents with    Eye Problem     Right eye red, swollen and tender for 3 weeks       History of Present Illness   Arnav Hartmann presents to the clinic c/o      24-year-old male with Red swollen tender right upper eyelid that has been bothering him for about 3 weeks  Has been doing warm compresses but not getting any better  Denies any acute visual changes  No gross discharge  Review of Systems   Review of Systems   Constitutional: Negative  HENT: Negative  Eyes: Negative for photophobia, pain, discharge, redness, itching and visual disturbance          Swelling tenderness eyelids right eye       Current Medications     Long-Term Medications   Medication Sig Dispense Refill    aspirin 325 mg tablet Take 325 mg by mouth daily      Blood Glucose Monitoring Suppl (Brandyn Tristan) w/Device KIT Use to test 2-3x daily 1 kit 1    cyclobenzaprine (FLEXERIL) 10 mg tablet 1 tab qd prn 90 tablet 0    cyclobenzaprine (FLEXERIL) 5 mg tablet TAKE 1 TO 2 TABLETS BY MOUTH 3 TIMES A DAY AS NEEDED FOR MUSCLE SPASMS      Insulin Pen Needle 32G X 6 MM MISC BD Ultra-Fine Mini Pen Needle 31 gauge x 3/16"      lisinopril (ZESTRIL) 10 mg tablet TAKE 1 TABLET BY MOUTH EVERY DAY 30 tablet 5    metFORMIN (GLUCOPHAGE) 500 mg tablet Two tab by mouth with breakfast and one tab by mouth with dinner 360 tablet 1    NOVOLOG MIX 70/30 FLEXPEN (70-30) 100 units/mL injection pen INJECT 30 UNITS UNDER THE SKIN 2 (TWO) TIMES A DAY BEFORE MEALS  3    tamsulosin (FLOMAX) 0 4 mg Take 1 capsule (0 4 mg total) by mouth daily with dinner 90 capsule 1       Current Allergies     Allergies as of 09/22/2021 - Reviewed 09/22/2021   Allergen Reaction Noted    No known allergies  10/08/2019          The following portions of the patient's history were reviewed and updated as appropriate: allergies, current medications, past family history, past medical history, past social history, past surgical history and problem list   Past Medical History:   Diagnosis Date    Colon polyp     Diabetes mellitus ([address] )     Hypertension      Past Surgical History:   Procedure Laterality Date    COLONOSCOPY      ROTATOR CUFF REPAIR Left      Family History   Problem Relation Age of Onset    Alzheimer's disease Mother     Stomach cancer Father     Cancer Father [de-identified]        stomach    Pancreatic cancer Brother        Objective   /77   Pulse 98   Temp (!) 96 °F (35 6 °C) (Tympanic)   Resp 18   SpO2 98%   No LMP for male patient  Physical Exam     Physical Exam  Vitals and nursing note reviewed  Constitutional:       General: He is not in acute distress  Appearance: Normal appearance  He is well-developed  He is not ill-appearing, toxic-appearing or diaphoretic     HENT:      Head: Normocephalic and atraumatic  Eyes:      General:         Right eye: Hordeolum present  Left eye: No hordeolum  Conjunctiva/sclera:      Right eye: Right conjunctiva is not injected  Left eye: Left conjunctiva is not injected  Comments:  Swelling redness right upper eyelid with TTP  Mild swelling redness along lower eyelid without TTP  Cardiovascular:      Rate and Rhythm: Normal rate and regular rhythm  Pulmonary:      Effort: Pulmonary effort is normal    Skin:     General: Skin is warm and dry  Neurological:      Mental Status: He is alert and oriented to person, place, and time

## 2021-10-05 ENCOUNTER — TELEPHONE (OUTPATIENT)
Dept: ADMINISTRATIVE | Facility: OTHER | Age: 66
End: 2021-10-05

## 2021-10-05 ENCOUNTER — OFFICE VISIT (OUTPATIENT)
Dept: FAMILY MEDICINE CLINIC | Facility: CLINIC | Age: 66
End: 2021-10-05
Payer: COMMERCIAL

## 2021-10-05 ENCOUNTER — APPOINTMENT (OUTPATIENT)
Dept: LAB | Facility: CLINIC | Age: 66
End: 2021-10-05
Payer: COMMERCIAL

## 2021-10-05 VITALS
RESPIRATION RATE: 19 BRPM | TEMPERATURE: 97.3 F | WEIGHT: 301.2 LBS | SYSTOLIC BLOOD PRESSURE: 126 MMHG | HEIGHT: 78 IN | OXYGEN SATURATION: 96 % | HEART RATE: 97 BPM | BODY MASS INDEX: 34.85 KG/M2 | DIASTOLIC BLOOD PRESSURE: 72 MMHG

## 2021-10-05 DIAGNOSIS — M20.42 HAMMERTOES OF BOTH FEET: ICD-10-CM

## 2021-10-05 DIAGNOSIS — E11.9 TYPE 2 DIABETES MELLITUS WITHOUT COMPLICATION, WITH LONG-TERM CURRENT USE OF INSULIN (HCC): ICD-10-CM

## 2021-10-05 DIAGNOSIS — Z79.4 TYPE 2 DIABETES MELLITUS WITHOUT COMPLICATION, WITH LONG-TERM CURRENT USE OF INSULIN (HCC): Primary | ICD-10-CM

## 2021-10-05 DIAGNOSIS — Z79.4 TYPE 2 DIABETES MELLITUS WITHOUT COMPLICATION, WITH LONG-TERM CURRENT USE OF INSULIN (HCC): ICD-10-CM

## 2021-10-05 DIAGNOSIS — E11.9 TYPE 2 DIABETES MELLITUS WITHOUT COMPLICATION, WITH LONG-TERM CURRENT USE OF INSULIN (HCC): Primary | ICD-10-CM

## 2021-10-05 DIAGNOSIS — M21.612 BUNION, LEFT: ICD-10-CM

## 2021-10-05 DIAGNOSIS — M20.41 HAMMERTOES OF BOTH FEET: ICD-10-CM

## 2021-10-05 DIAGNOSIS — Z01.810 PREOP CARDIOVASCULAR EXAM: ICD-10-CM

## 2021-10-05 LAB
EST. AVERAGE GLUCOSE BLD GHB EST-MCNC: 169 MG/DL
HBA1C MFR BLD: 7.5 %

## 2021-10-05 PROCEDURE — 93000 ELECTROCARDIOGRAM COMPLETE: CPT | Performed by: FAMILY MEDICINE

## 2021-10-05 PROCEDURE — 36415 COLL VENOUS BLD VENIPUNCTURE: CPT

## 2021-10-05 PROCEDURE — 1160F RVW MEDS BY RX/DR IN RCRD: CPT | Performed by: FAMILY MEDICINE

## 2021-10-05 PROCEDURE — 83036 HEMOGLOBIN GLYCOSYLATED A1C: CPT

## 2021-10-05 PROCEDURE — 99214 OFFICE O/P EST MOD 30 MIN: CPT | Performed by: FAMILY MEDICINE

## 2021-10-05 PROCEDURE — 3051F HG A1C>EQUAL 7.0%<8.0%: CPT | Performed by: FAMILY MEDICINE

## 2021-10-05 PROCEDURE — 1036F TOBACCO NON-USER: CPT | Performed by: FAMILY MEDICINE

## 2021-10-11 ENCOUNTER — TELEPHONE (OUTPATIENT)
Dept: FAMILY MEDICINE CLINIC | Facility: CLINIC | Age: 66
End: 2021-10-11

## 2021-10-13 ENCOUNTER — ANESTHESIA EVENT (OUTPATIENT)
Dept: PERIOP | Facility: HOSPITAL | Age: 66
End: 2021-10-13
Payer: COMMERCIAL

## 2021-10-15 ENCOUNTER — ANESTHESIA (OUTPATIENT)
Dept: PERIOP | Facility: HOSPITAL | Age: 66
End: 2021-10-15
Payer: COMMERCIAL

## 2021-10-15 ENCOUNTER — HOSPITAL ENCOUNTER (OUTPATIENT)
Dept: RADIOLOGY | Facility: HOSPITAL | Age: 66
Setting detail: OUTPATIENT SURGERY
Discharge: HOME | End: 2021-10-15
Payer: COMMERCIAL

## 2021-10-15 ENCOUNTER — APPOINTMENT (OUTPATIENT)
Dept: RADIOLOGY | Facility: HOSPITAL | Age: 66
End: 2021-10-15
Payer: COMMERCIAL

## 2021-10-15 ENCOUNTER — HOSPITAL ENCOUNTER (OUTPATIENT)
Facility: HOSPITAL | Age: 66
Setting detail: OUTPATIENT SURGERY
Discharge: HOME | End: 2021-10-15
Attending: PODIATRIST | Admitting: PODIATRIST
Payer: COMMERCIAL

## 2021-10-15 VITALS
TEMPERATURE: 97.3 F | WEIGHT: 304 LBS | OXYGEN SATURATION: 98 % | HEIGHT: 78 IN | SYSTOLIC BLOOD PRESSURE: 115 MMHG | DIASTOLIC BLOOD PRESSURE: 65 MMHG | RESPIRATION RATE: 16 BRPM | HEART RATE: 65 BPM | BODY MASS INDEX: 35.17 KG/M2

## 2021-10-15 DIAGNOSIS — Z98.890 POST-OPERATIVE STATE: Primary | ICD-10-CM

## 2021-10-15 DIAGNOSIS — M21.612 BUNION, LEFT: ICD-10-CM

## 2021-10-15 LAB
GLUCOSE SERPL-MCNC: 108 MG/DL (ref 65–140)
GLUCOSE SERPL-MCNC: 160 MG/DL (ref 65–140)

## 2021-10-15 PROCEDURE — 28298 COR HLX VLGS PRX PHLX OSTEOT: CPT | Performed by: PODIATRIST

## 2021-10-15 PROCEDURE — 73630 X-RAY EXAM OF FOOT: CPT

## 2021-10-15 PROCEDURE — 82948 REAGENT STRIP/BLOOD GLUCOSE: CPT

## 2021-10-15 PROCEDURE — 99024 POSTOP FOLLOW-UP VISIT: CPT | Performed by: PODIATRIST

## 2021-10-15 PROCEDURE — C1713 ANCHOR/SCREW BN/BN,TIS/BN: HCPCS | Performed by: PODIATRIST

## 2021-10-15 PROCEDURE — 28285 REPAIR OF HAMMERTOE: CPT | Performed by: PODIATRIST

## 2021-10-15 DEVICE — DYNANITE NITI STAPLE W/INSTRS 9WX7L
Type: IMPLANTABLE DEVICE | Site: FOOT | Status: FUNCTIONAL
Brand: ARTHREX®

## 2021-10-15 RX ORDER — FENTANYL CITRATE/PF 50 MCG/ML
25 SYRINGE (ML) INJECTION
Status: DISCONTINUED | OUTPATIENT
Start: 2021-10-15 | End: 2021-10-15 | Stop reason: HOSPADM

## 2021-10-15 RX ORDER — CEFAZOLIN SODIUM 2 G/50ML
2000 SOLUTION INTRAVENOUS ONCE
Status: DISCONTINUED | OUTPATIENT
Start: 2021-10-15 | End: 2021-10-15 | Stop reason: HOSPADM

## 2021-10-15 RX ORDER — SODIUM CHLORIDE, SODIUM LACTATE, POTASSIUM CHLORIDE, CALCIUM CHLORIDE 600; 310; 30; 20 MG/100ML; MG/100ML; MG/100ML; MG/100ML
125 INJECTION, SOLUTION INTRAVENOUS CONTINUOUS
Status: DISCONTINUED | OUTPATIENT
Start: 2021-10-15 | End: 2021-10-15 | Stop reason: HOSPADM

## 2021-10-15 RX ORDER — ONDANSETRON 2 MG/ML
4 INJECTION INTRAMUSCULAR; INTRAVENOUS ONCE AS NEEDED
Status: DISCONTINUED | OUTPATIENT
Start: 2021-10-15 | End: 2021-10-15 | Stop reason: HOSPADM

## 2021-10-15 RX ORDER — MAGNESIUM HYDROXIDE 1200 MG/15ML
LIQUID ORAL AS NEEDED
Status: DISCONTINUED | OUTPATIENT
Start: 2021-10-15 | End: 2021-10-15 | Stop reason: HOSPADM

## 2021-10-15 RX ORDER — EPHEDRINE SULFATE 50 MG/ML
INJECTION INTRAVENOUS AS NEEDED
Status: DISCONTINUED | OUTPATIENT
Start: 2021-10-15 | End: 2021-10-15

## 2021-10-15 RX ORDER — OXYCODONE HYDROCHLORIDE AND ACETAMINOPHEN 5; 325 MG/1; MG/1
1 TABLET ORAL EVERY 4 HOURS PRN
Qty: 10 TABLET | Refills: 0 | Status: SHIPPED | OUTPATIENT
Start: 2021-10-15 | End: 2021-10-25

## 2021-10-15 RX ORDER — MEPERIDINE HYDROCHLORIDE 25 MG/ML
12.5 INJECTION INTRAMUSCULAR; INTRAVENOUS; SUBCUTANEOUS
Status: DISCONTINUED | OUTPATIENT
Start: 2021-10-15 | End: 2021-10-15 | Stop reason: HOSPADM

## 2021-10-15 RX ORDER — LIDOCAINE HYDROCHLORIDE 20 MG/ML
INJECTION, SOLUTION EPIDURAL; INFILTRATION; INTRACAUDAL; PERINEURAL AS NEEDED
Status: DISCONTINUED | OUTPATIENT
Start: 2021-10-15 | End: 2021-10-15

## 2021-10-15 RX ORDER — MIDAZOLAM HYDROCHLORIDE 2 MG/2ML
INJECTION, SOLUTION INTRAMUSCULAR; INTRAVENOUS AS NEEDED
Status: DISCONTINUED | OUTPATIENT
Start: 2021-10-15 | End: 2021-10-15

## 2021-10-15 RX ORDER — ONDANSETRON 2 MG/ML
INJECTION INTRAMUSCULAR; INTRAVENOUS AS NEEDED
Status: DISCONTINUED | OUTPATIENT
Start: 2021-10-15 | End: 2021-10-15

## 2021-10-15 RX ORDER — CHLORHEXIDINE GLUCONATE 0.12 MG/ML
15 RINSE ORAL ONCE
Status: COMPLETED | OUTPATIENT
Start: 2021-10-15 | End: 2021-10-15

## 2021-10-15 RX ORDER — OXYCODONE HYDROCHLORIDE AND ACETAMINOPHEN 5; 325 MG/1; MG/1
1 TABLET ORAL EVERY 4 HOURS PRN
Status: DISCONTINUED | OUTPATIENT
Start: 2021-10-15 | End: 2021-10-15 | Stop reason: HOSPADM

## 2021-10-15 RX ORDER — FENTANYL CITRATE 50 UG/ML
INJECTION, SOLUTION INTRAMUSCULAR; INTRAVENOUS AS NEEDED
Status: DISCONTINUED | OUTPATIENT
Start: 2021-10-15 | End: 2021-10-15

## 2021-10-15 RX ORDER — KETOROLAC TROMETHAMINE 30 MG/ML
INJECTION, SOLUTION INTRAMUSCULAR; INTRAVENOUS AS NEEDED
Status: DISCONTINUED | OUTPATIENT
Start: 2021-10-15 | End: 2021-10-15

## 2021-10-15 RX ORDER — HYDROMORPHONE HCL/PF 1 MG/ML
0.5 SYRINGE (ML) INJECTION
Status: DISCONTINUED | OUTPATIENT
Start: 2021-10-15 | End: 2021-10-15 | Stop reason: HOSPADM

## 2021-10-15 RX ORDER — PROPOFOL 10 MG/ML
INJECTION, EMULSION INTRAVENOUS AS NEEDED
Status: DISCONTINUED | OUTPATIENT
Start: 2021-10-15 | End: 2021-10-15

## 2021-10-15 RX ADMIN — FENTANYL CITRATE 25 MCG: 50 INJECTION INTRAMUSCULAR; INTRAVENOUS at 13:48

## 2021-10-15 RX ADMIN — ONDANSETRON 4 MG: 2 INJECTION INTRAMUSCULAR; INTRAVENOUS at 14:35

## 2021-10-15 RX ADMIN — PROPOFOL 200 MG: 10 INJECTION, EMULSION INTRAVENOUS at 13:38

## 2021-10-15 RX ADMIN — SODIUM CHLORIDE, SODIUM LACTATE, POTASSIUM CHLORIDE, AND CALCIUM CHLORIDE 125 ML/HR: .6; .31; .03; .02 INJECTION, SOLUTION INTRAVENOUS at 11:31

## 2021-10-15 RX ADMIN — SODIUM CHLORIDE, SODIUM LACTATE, POTASSIUM CHLORIDE, AND CALCIUM CHLORIDE: .6; .31; .03; .02 INJECTION, SOLUTION INTRAVENOUS at 14:52

## 2021-10-15 RX ADMIN — KETOROLAC TROMETHAMINE 15 MG: 30 INJECTION, SOLUTION INTRAMUSCULAR at 15:17

## 2021-10-15 RX ADMIN — OXYCODONE HYDROCHLORIDE AND ACETAMINOPHEN 1 TABLET: 5; 325 TABLET ORAL at 17:06

## 2021-10-15 RX ADMIN — MIDAZOLAM 2 MG: 1 INJECTION INTRAMUSCULAR; INTRAVENOUS at 13:31

## 2021-10-15 RX ADMIN — LIDOCAINE HYDROCHLORIDE 100 MG: 20 INJECTION, SOLUTION EPIDURAL; INFILTRATION; INTRACAUDAL; PERINEURAL at 13:38

## 2021-10-15 RX ADMIN — FENTANYL CITRATE 25 MCG: 50 INJECTION INTRAMUSCULAR; INTRAVENOUS at 15:17

## 2021-10-15 RX ADMIN — CHLORHEXIDINE GLUCONATE 0.12% ORAL RINSE 15 ML: 1.2 LIQUID ORAL at 11:31

## 2021-10-15 RX ADMIN — Medication 3000 MG: at 13:27

## 2021-10-15 RX ADMIN — EPHEDRINE SULFATE 10 MG: 50 INJECTION, SOLUTION INTRAVENOUS at 14:23

## 2021-10-21 ENCOUNTER — OFFICE VISIT (OUTPATIENT)
Dept: PODIATRY | Facility: CLINIC | Age: 66
End: 2021-10-21

## 2021-10-21 VITALS
BODY MASS INDEX: 35.13 KG/M2 | DIASTOLIC BLOOD PRESSURE: 81 MMHG | HEART RATE: 96 BPM | WEIGHT: 304 LBS | SYSTOLIC BLOOD PRESSURE: 133 MMHG

## 2021-10-21 DIAGNOSIS — M20.42 HAMMERTOES OF BOTH FEET: Primary | ICD-10-CM

## 2021-10-21 DIAGNOSIS — M20.41 HAMMERTOES OF BOTH FEET: Primary | ICD-10-CM

## 2021-10-21 PROCEDURE — 99024 POSTOP FOLLOW-UP VISIT: CPT | Performed by: PODIATRIST

## 2021-10-28 ENCOUNTER — OFFICE VISIT (OUTPATIENT)
Dept: PODIATRY | Facility: CLINIC | Age: 66
End: 2021-10-28

## 2021-10-28 VITALS
HEIGHT: 78 IN | WEIGHT: 294.8 LBS | SYSTOLIC BLOOD PRESSURE: 116 MMHG | DIASTOLIC BLOOD PRESSURE: 77 MMHG | BODY MASS INDEX: 34.11 KG/M2 | HEART RATE: 83 BPM

## 2021-10-28 DIAGNOSIS — M20.42 HAMMERTOES OF BOTH FEET: Primary | ICD-10-CM

## 2021-10-28 DIAGNOSIS — M20.41 HAMMERTOES OF BOTH FEET: Primary | ICD-10-CM

## 2021-10-28 PROCEDURE — 99024 POSTOP FOLLOW-UP VISIT: CPT | Performed by: PODIATRIST

## 2021-10-28 PROCEDURE — 3008F BODY MASS INDEX DOCD: CPT | Performed by: FAMILY MEDICINE

## 2021-11-16 ENCOUNTER — OFFICE VISIT (OUTPATIENT)
Dept: PODIATRY | Facility: CLINIC | Age: 66
End: 2021-11-16

## 2021-11-16 VITALS
SYSTOLIC BLOOD PRESSURE: 114 MMHG | HEART RATE: 87 BPM | WEIGHT: 300 LBS | BODY MASS INDEX: 34.67 KG/M2 | DIASTOLIC BLOOD PRESSURE: 72 MMHG

## 2021-11-16 DIAGNOSIS — M20.41 HAMMERTOES OF BOTH FEET: Primary | ICD-10-CM

## 2021-11-16 DIAGNOSIS — M20.42 HAMMERTOES OF BOTH FEET: Primary | ICD-10-CM

## 2021-11-16 PROCEDURE — 99024 POSTOP FOLLOW-UP VISIT: CPT | Performed by: PODIATRIST

## 2021-12-08 ENCOUNTER — OFFICE VISIT (OUTPATIENT)
Dept: PODIATRY | Facility: CLINIC | Age: 66
End: 2021-12-08

## 2021-12-08 VITALS
SYSTOLIC BLOOD PRESSURE: 147 MMHG | DIASTOLIC BLOOD PRESSURE: 77 MMHG | WEIGHT: 301 LBS | BODY MASS INDEX: 34.78 KG/M2 | HEART RATE: 85 BPM

## 2021-12-08 DIAGNOSIS — M20.42 HAMMERTOES OF BOTH FEET: Primary | ICD-10-CM

## 2021-12-08 DIAGNOSIS — M20.41 HAMMERTOES OF BOTH FEET: Primary | ICD-10-CM

## 2021-12-08 PROCEDURE — 99024 POSTOP FOLLOW-UP VISIT: CPT | Performed by: PODIATRIST

## 2021-12-22 ENCOUNTER — VBI (OUTPATIENT)
Dept: ADMINISTRATIVE | Facility: OTHER | Age: 66
End: 2021-12-22

## 2021-12-23 ENCOUNTER — RA CDI HCC (OUTPATIENT)
Dept: OTHER | Facility: HOSPITAL | Age: 66
End: 2021-12-23

## 2021-12-30 ENCOUNTER — OFFICE VISIT (OUTPATIENT)
Dept: FAMILY MEDICINE CLINIC | Facility: CLINIC | Age: 66
End: 2021-12-30
Payer: COMMERCIAL

## 2021-12-30 VITALS
RESPIRATION RATE: 18 BRPM | HEIGHT: 78 IN | SYSTOLIC BLOOD PRESSURE: 126 MMHG | TEMPERATURE: 97.2 F | WEIGHT: 302 LBS | HEART RATE: 86 BPM | BODY MASS INDEX: 34.94 KG/M2 | OXYGEN SATURATION: 97 % | DIASTOLIC BLOOD PRESSURE: 72 MMHG

## 2021-12-30 DIAGNOSIS — Z00.00 ANNUAL PHYSICAL EXAM: Primary | ICD-10-CM

## 2021-12-30 DIAGNOSIS — I10 ESSENTIAL HYPERTENSION: ICD-10-CM

## 2021-12-30 DIAGNOSIS — Z79.4 TYPE 2 DIABETES MELLITUS WITHOUT COMPLICATION, WITH LONG-TERM CURRENT USE OF INSULIN (HCC): ICD-10-CM

## 2021-12-30 DIAGNOSIS — E78.2 MIXED HYPERLIPIDEMIA: ICD-10-CM

## 2021-12-30 DIAGNOSIS — E11.9 TYPE 2 DIABETES MELLITUS WITHOUT COMPLICATION, WITH LONG-TERM CURRENT USE OF INSULIN (HCC): ICD-10-CM

## 2021-12-30 PROCEDURE — 99397 PER PM REEVAL EST PAT 65+ YR: CPT | Performed by: FAMILY MEDICINE

## 2021-12-30 PROCEDURE — 1101F PT FALLS ASSESS-DOCD LE1/YR: CPT | Performed by: FAMILY MEDICINE

## 2022-04-21 DIAGNOSIS — E11.9 TYPE 2 DIABETES MELLITUS WITHOUT COMPLICATION, WITH LONG-TERM CURRENT USE OF INSULIN (HCC): ICD-10-CM

## 2022-04-21 DIAGNOSIS — Z79.4 TYPE 2 DIABETES MELLITUS WITHOUT COMPLICATION, WITH LONG-TERM CURRENT USE OF INSULIN (HCC): ICD-10-CM

## 2022-04-24 DIAGNOSIS — I10 ESSENTIAL HYPERTENSION: ICD-10-CM

## 2022-04-25 RX ORDER — LISINOPRIL 10 MG/1
TABLET ORAL
Qty: 30 TABLET | Refills: 5 | Status: SHIPPED | OUTPATIENT
Start: 2022-04-25 | End: 2022-05-19

## 2022-05-05 ENCOUNTER — RA CDI HCC (OUTPATIENT)
Dept: OTHER | Facility: HOSPITAL | Age: 67
End: 2022-05-05

## 2022-05-05 NOTE — PROGRESS NOTES
[facility]  coding opportunities          Chart Reviewed number of suggestions sent to Provider: 1     E11 63 Type 2 diabetes mellitus with hyperglycemia   *Elevated A1c:labs    If this is correct, please document and assess at your next visit,5/12/2022    Patients Insurance        Commercial Insurance: [employer]

## 2022-05-09 PROBLEM — E11.65 TYPE 2 DIABETES MELLITUS WITH HYPERGLYCEMIA (HCC): Status: ACTIVE | Noted: 2022-05-09

## 2022-05-12 ENCOUNTER — OFFICE VISIT (OUTPATIENT)
Dept: FAMILY MEDICINE CLINIC | Facility: CLINIC | Age: 67
End: 2022-05-12
Payer: MEDICARE

## 2022-05-12 VITALS
HEART RATE: 87 BPM | HEIGHT: 78 IN | DIASTOLIC BLOOD PRESSURE: 68 MMHG | WEIGHT: 300.2 LBS | TEMPERATURE: 97.4 F | RESPIRATION RATE: 18 BRPM | OXYGEN SATURATION: 97 % | BODY MASS INDEX: 34.73 KG/M2 | SYSTOLIC BLOOD PRESSURE: 122 MMHG

## 2022-05-12 DIAGNOSIS — E66.01 MORBID OBESITY DUE TO EXCESS CALORIES (HCC): ICD-10-CM

## 2022-05-12 DIAGNOSIS — Z79.4 TYPE 2 DIABETES MELLITUS WITH HYPERGLYCEMIA, WITH LONG-TERM CURRENT USE OF INSULIN (HCC): ICD-10-CM

## 2022-05-12 DIAGNOSIS — E11.42 DIABETIC POLYNEUROPATHY ASSOCIATED WITH TYPE 2 DIABETES MELLITUS (HCC): ICD-10-CM

## 2022-05-12 DIAGNOSIS — E11.9 TYPE 2 DIABETES MELLITUS WITHOUT COMPLICATION, WITH LONG-TERM CURRENT USE OF INSULIN (HCC): Primary | ICD-10-CM

## 2022-05-12 DIAGNOSIS — Z79.4 TYPE 2 DIABETES MELLITUS WITHOUT COMPLICATION, WITH LONG-TERM CURRENT USE OF INSULIN (HCC): Primary | ICD-10-CM

## 2022-05-12 DIAGNOSIS — E11.65 TYPE 2 DIABETES MELLITUS WITH HYPERGLYCEMIA, WITH LONG-TERM CURRENT USE OF INSULIN (HCC): ICD-10-CM

## 2022-05-12 PROCEDURE — 99214 OFFICE O/P EST MOD 30 MIN: CPT | Performed by: FAMILY MEDICINE

## 2022-05-12 NOTE — PROGRESS NOTES
Assessment/Plan:    Patient is seen for follow-up of chronic medical conditions  Type 2 diabetes mellitus with hyperglycemia ([facility])    Lab Results   Component Value Date    HGBA1C 7 5 (H) 05/07/2022   He has started exercising  Continue on insulin  Recheck in four months  Has eye appointment scheduled  Diagnoses and all orders for this visit:    Type 2 diabetes mellitus without complication, with long-term current use of insulin ([facility])  -     HEMOGLOBIN A1C W/ EAG ESTIMATION; Future  -     Comprehensive metabolic panel; Future    Diabetic polyneuropathy associated with type 2 diabetes mellitus ([facility] )    Type 2 diabetes mellitus with hyperglycemia, with long-term current use of insulin ([facility] )    Morbid obesity due to excess calories ([facility] )        Subjective:   Chief Complaint   Patient presents with    Diabetes        Patient ID: Benoit Martinez is a 74 y o  male  Patient is here for follow up of chronic medical conditions  The following portions of the patient's history were reviewed and updated as appropriate: allergies, current medications, past family history, past medical history, past social history, past surgical history and problem list     Review of Systems   Constitutional: Negative for chills and fever  HENT: Negative for congestion and sore throat  Respiratory: Negative for chest tightness  Cardiovascular: Negative for chest pain and palpitations  Gastrointestinal: Negative for abdominal pain, constipation, diarrhea and nausea  Genitourinary: Negative for difficulty urinating  Skin: Negative  Neurological: Positive for numbness (feet - left greater than right)  Negative for dizziness and headaches  Psychiatric/Behavioral: Negative            Objective:      /68 (BP Location: Left arm, Patient Position: Sitting, Cuff Size: Large)   Pulse 87   Temp (!) 97 4 °F (36 3 °C) (Temporal)   Resp 18   Ht 6' 6" (1 981 m)   Wt (!) 136 kg (300 lb 3 2 oz)   SpO2 97%   BMI 34 69 kg/m²          Physical Exam  Vitals and nursing note reviewed  Constitutional:       General: He is not in acute distress  Appearance: He is obese  HENT:      Head: Normocephalic  Neck:      Thyroid: No thyromegaly  Vascular: No carotid bruit  Cardiovascular:      Rate and Rhythm: Normal rate and regular rhythm  Pulses: Pulses are weak  Dorsalis pedis pulses are 2+ on the right side and 1+ on the left side  Posterior tibial pulses are 2+ on the right side and 1+ on the left side  Heart sounds: Normal heart sounds  Pulmonary:      Effort: Pulmonary effort is normal       Breath sounds: Normal breath sounds  Musculoskeletal:      Right lower leg: No edema  Left lower leg: No edema  Feet:      Right foot:      Skin integrity: Dry skin present  Left foot:      Skin integrity: Dry skin present  Lymphadenopathy:      Cervical: No cervical adenopathy  Skin:     General: Skin is warm and dry  Neurological:      Mental Status: He is alert and oriented to person, place, and time  Psychiatric:         Mood and Affect: Mood normal        Diabetic Foot Exam    Patient's shoes and socks removed  Right Foot/Ankle   Right Foot Inspection  Skin Exam: skin intact and dry skin  Toe Exam: ROM and strength within normal limits  Sensory   Monofilament testing: intact    Vascular  The right DP pulse is 2+  The right PT pulse is 2+  Left Foot/Ankle  Left Foot Inspection  Skin Exam: skin intact and dry skin  Toe Exam: ROM and strength within normal limits and left toe deformity  Sensory   Monofilament testing: intact    Vascular  The left DP pulse is 1+  The left PT pulse is 1+       Assign Risk Category  Deformity present  No loss of protective sensation  Weak pulses  Risk: 0

## 2022-05-12 NOTE — ASSESSMENT & PLAN NOTE
Lab Results   Component Value Date    HGBA1C 7 5 (H) 05/07/2022   He has started exercising  Continue on insulin  Recheck in four months  Has eye appointment scheduled

## 2022-05-18 DIAGNOSIS — I10 ESSENTIAL HYPERTENSION: ICD-10-CM

## 2022-05-19 RX ORDER — LISINOPRIL 10 MG/1
TABLET ORAL
Qty: 90 TABLET | Refills: 1 | Status: SHIPPED | OUTPATIENT
Start: 2022-05-19

## 2022-06-13 DIAGNOSIS — E11.9 TYPE 2 DIABETES MELLITUS WITHOUT COMPLICATION, WITH LONG-TERM CURRENT USE OF INSULIN (HCC): ICD-10-CM

## 2022-06-13 DIAGNOSIS — Z79.4 TYPE 2 DIABETES MELLITUS WITHOUT COMPLICATION, WITH LONG-TERM CURRENT USE OF INSULIN (HCC): ICD-10-CM

## 2022-06-14 RX ORDER — INSULIN ASPART 100 [IU]/ML
30 INJECTION, SUSPENSION SUBCUTANEOUS 2 TIMES DAILY WITH MEALS
Qty: 15 ML | Refills: 1 | Status: SHIPPED | OUTPATIENT
Start: 2022-06-14

## 2022-08-11 DIAGNOSIS — Z79.4 TYPE 2 DIABETES MELLITUS WITHOUT COMPLICATION, WITH LONG-TERM CURRENT USE OF INSULIN (HCC): ICD-10-CM

## 2022-08-11 DIAGNOSIS — E11.9 TYPE 2 DIABETES MELLITUS WITHOUT COMPLICATION, WITH LONG-TERM CURRENT USE OF INSULIN (HCC): ICD-10-CM

## 2022-08-11 RX ORDER — INSULIN ASPART 100 [IU]/ML
30 INJECTION, SUSPENSION SUBCUTANEOUS 2 TIMES DAILY WITH MEALS
Qty: 15 ML | Refills: 1 | Status: SHIPPED | OUTPATIENT
Start: 2022-08-11 | End: 2022-09-19 | Stop reason: SDUPTHER

## 2022-09-01 ENCOUNTER — APPOINTMENT (OUTPATIENT)
Dept: RADIOLOGY | Facility: CLINIC | Age: 67
End: 2022-09-01
Payer: MEDICARE

## 2022-09-01 ENCOUNTER — OFFICE VISIT (OUTPATIENT)
Dept: FAMILY MEDICINE CLINIC | Facility: CLINIC | Age: 67
End: 2022-09-01
Payer: MEDICARE

## 2022-09-01 VITALS
BODY MASS INDEX: 34.94 KG/M2 | OXYGEN SATURATION: 96 % | RESPIRATION RATE: 18 BRPM | DIASTOLIC BLOOD PRESSURE: 74 MMHG | HEIGHT: 78 IN | TEMPERATURE: 98.6 F | WEIGHT: 302 LBS | HEART RATE: 101 BPM | SYSTOLIC BLOOD PRESSURE: 102 MMHG

## 2022-09-01 DIAGNOSIS — Z86.16 HISTORY OF COVID-19: ICD-10-CM

## 2022-09-01 DIAGNOSIS — R05.9 COUGH: ICD-10-CM

## 2022-09-01 DIAGNOSIS — R07.9 CHEST PAIN, UNSPECIFIED TYPE: Primary | ICD-10-CM

## 2022-09-01 PROCEDURE — 93000 ELECTROCARDIOGRAM COMPLETE: CPT | Performed by: FAMILY MEDICINE

## 2022-09-01 PROCEDURE — 71046 X-RAY EXAM CHEST 2 VIEWS: CPT

## 2022-09-01 PROCEDURE — 99214 OFFICE O/P EST MOD 30 MIN: CPT | Performed by: FAMILY MEDICINE

## 2022-09-01 NOTE — PROGRESS NOTES
Assessment/Plan:    Patient is a 47-year-old male with type 2 diabetes  His diabetes is not well controlled  Patient had COVID a couple weeks ago  Since then he has had chest pressure /tightness  He denies cough  I did do an EKG in the office that was normal with a sinus arrhythmia  I did order a chest x-ray  But since he is a diabetic that is not well controlled and also has elevated lipids, I am going to order a stress test to rule out any coronary artery disease  Diagnoses and all orders for this visit:    Chest pain, unspecified type  -     POCT ECG  -     NM myocardial perfusion spect (rx stress and/or rest); Future    Cough  -     XR chest pa & lateral; Future    History of COVID-19  -     XR chest pa & lateral; Future        Subjective:   Chief Complaint   Patient presents with    Follow-up        Patient ID: Alma Delia Hall is a 74 y o  male  Woke up two days ago that he was having trouble breathing  Chest tightness  Uncomfortable  He is under stress  He did have COVID - spoke to him 8/17  The following portions of the patient's history were reviewed and updated as appropriate: allergies, current medications, past family history, past medical history, past social history, past surgical history and problem list     Review of Systems   Constitutional: Negative for chills and fever  Respiratory: Negative for cough and shortness of breath  Cardiovascular: Positive for chest pain  Negative for palpitations and leg swelling  Neurological: Negative for dizziness, light-headedness and headaches  Objective:      /74   Pulse 101   Temp 98 6 °F (37 °C) (Tympanic)   Resp 18   Ht 6' 6" (1 981 m)   Wt (!) 137 kg (302 lb)   SpO2 96%   BMI 34 90 kg/m²          Physical Exam  Vitals and nursing note reviewed  Constitutional:       General: He is not in acute distress  Appearance: He is obese  HENT:      Head: Normocephalic  Neck:      Thyroid: No thyromegaly  Vascular: No carotid bruit  Cardiovascular:      Rate and Rhythm: Normal rate and regular rhythm  Heart sounds: Normal heart sounds  Pulmonary:      Effort: Pulmonary effort is normal       Breath sounds: Normal breath sounds  Musculoskeletal:      Right lower leg: No edema  Left lower leg: No edema  Lymphadenopathy:      Cervical: No cervical adenopathy  Skin:     General: Skin is warm and dry  Neurological:      Mental Status: He is alert and oriented to person, place, and time     Psychiatric:         Mood and Affect: Mood normal

## 2022-09-07 LAB — HBA1C MFR BLD HPLC: 7.4 %

## 2022-09-13 ENCOUNTER — OFFICE VISIT (OUTPATIENT)
Dept: FAMILY MEDICINE CLINIC | Facility: CLINIC | Age: 67
End: 2022-09-13
Payer: MEDICARE

## 2022-09-13 VITALS
WEIGHT: 300 LBS | SYSTOLIC BLOOD PRESSURE: 122 MMHG | TEMPERATURE: 97.5 F | BODY MASS INDEX: 34.71 KG/M2 | OXYGEN SATURATION: 98 % | HEIGHT: 78 IN | DIASTOLIC BLOOD PRESSURE: 68 MMHG | HEART RATE: 96 BPM | RESPIRATION RATE: 16 BRPM

## 2022-09-13 DIAGNOSIS — E11.9 TYPE 2 DIABETES MELLITUS WITHOUT COMPLICATION, WITH LONG-TERM CURRENT USE OF INSULIN (HCC): Primary | ICD-10-CM

## 2022-09-13 DIAGNOSIS — Z00.00 WELCOME TO MEDICARE PREVENTIVE VISIT: ICD-10-CM

## 2022-09-13 DIAGNOSIS — I10 ESSENTIAL HYPERTENSION: ICD-10-CM

## 2022-09-13 DIAGNOSIS — Z79.4 TYPE 2 DIABETES MELLITUS WITHOUT COMPLICATION, WITH LONG-TERM CURRENT USE OF INSULIN (HCC): Primary | ICD-10-CM

## 2022-09-13 DIAGNOSIS — E78.2 MIXED HYPERLIPIDEMIA: ICD-10-CM

## 2022-09-13 PROCEDURE — G0402 INITIAL PREVENTIVE EXAM: HCPCS | Performed by: FAMILY MEDICINE

## 2022-09-13 PROCEDURE — 99214 OFFICE O/P EST MOD 30 MIN: CPT | Performed by: FAMILY MEDICINE

## 2022-09-13 NOTE — ASSESSMENT & PLAN NOTE
Lab Results   Component Value Date    HGBA1C 7 4 (H) 09/07/2022   Patient is actually surprised - he has not been eating well

## 2022-09-13 NOTE — PROGRESS NOTES
Name: Adenike Arias      : 1955      MRN: [id number]  Encounter Provider: Dariusz Keller DO  Encounter Date: 2022   Encounter department: [address] [place] [address]   Patient is seen for follow up of chronic medical conditions  1  Type 2 diabetes mellitus without complication, with long-term current use of insulin [facility])  Assessment & Plan:    Lab Results   Component Value Date    HGBA1C 7 4 (H) 2022   Patient is actually surprised - he has not been eating well  Orders:  -     Comprehensive metabolic panel; Future; Expected date: 2023  -     HEMOGLOBIN A1C W/ EAG ESTIMATION; Future; Expected date: 2023    2  Welcome to Medicare preventive visit    3  Essential hypertension  -     Comprehensive metabolic panel; Future; Expected date: 2023  -     TSH, 3rd generation with Free T4 reflex; Future; Expected date: 2023  -     CBC and differential; Future; Expected date: 2023    4  Mixed hyperlipidemia  -     Lipid Panel with Direct LDL reflex; Future; Expected date: 2023           Subjective        Patient is 78-year-old male seen for follow-up of chronic medical conditions  He is compliant with his medications  He did have blood work to discuss  Review of Systems   Constitutional: Negative for chills and fever  HENT: Negative for congestion and sore throat  Respiratory: Negative for chest tightness  Cardiovascular: Negative for chest pain and palpitations  Gastrointestinal: Negative for abdominal pain, constipation, diarrhea and nausea  Genitourinary: Negative for difficulty urinating  Skin: Negative  Neurological: Negative for dizziness and headaches  Psychiatric/Behavioral: Negative          Current Outpatient Medications on File Prior to Visit   Medication Sig    aspirin 325 mg tablet Take 325 mg by mouth daily    Blood Glucose Monitoring Suppl (ONETOUCH VERIO) w/Device KIT Use to test 2-3x daily    celecoxib (CeleBREX) 200 mg capsule Take 200 mg by mouth daily    cyclobenzaprine (FLEXERIL) 5 mg tablet TAKE 1 TO 2 TABLETS BY MOUTH 3 TIMES A DAY AS NEEDED FOR MUSCLE SPASMS    glucose blood (OneTouch Verio) test strip Use 2-3x daily    Influenza Virus Vacc Split PF 0 5 ML MOR Afluria 4281-5515(PF) 45 mcg (15 mcg x 3)/0 5 mL intramuscular syringe   inject 0 5 milliliter intramuscularly    Insulin Pen Needle 32G X 6 MM MISC BD Ultra-Fine Mini Pen Needle 31 gauge x 3/16"    OneTouch Delica Lancets 39J MISC Use to test 2-3x daily       Objective     /68 (BP Location: Right arm, Patient Position: Sitting)   Pulse 96   Temp 97 5 °F (36 4 °C)   Resp 16   Ht 6' 6" (1 981 m)   Wt 136 kg (300 lb)   SpO2 98%   BMI 34 67 kg/m²     Physical Exam  Vitals and nursing note reviewed  Constitutional:       General: He is not in acute distress  HENT:      Head: Normocephalic  Right Ear: Tympanic membrane normal       Left Ear: Tympanic membrane normal    Neck:      Thyroid: No thyromegaly  Cardiovascular:      Rate and Rhythm: Normal rate and regular rhythm  Heart sounds: Normal heart sounds  Pulmonary:      Effort: Pulmonary effort is normal       Breath sounds: Normal breath sounds  Musculoskeletal:      Right lower leg: No edema  Left lower leg: No edema  Lymphadenopathy:      Cervical: No cervical adenopathy  Skin:     General: Skin is warm and dry  Neurological:      Mental Status: He is alert and oriented to person, place, and time     Psychiatric:         Mood and Affect: Mood normal        Dariusz Keller DO

## 2022-09-13 NOTE — PROGRESS NOTES
Assessment and Plan:     Problem List Items Addressed This Visit        Endocrine    Type 2 diabetes mellitus without complication, with long-term current use of insulin ([address] ) - Primary       Lab Results   Component Value Date    HGBA1C 7 4 (H) 09/07/2022   Patient is actually surprised - he has not been eating well  Relevant Orders    Comprehensive metabolic panel    HEMOGLOBIN A1C W/ EAG ESTIMATION       Cardiovascular and Mediastinum    Essential hypertension    Relevant Orders    Comprehensive metabolic panel    TSH, 3rd generation with Free T4 reflex    CBC and differential       Other    Mixed hyperlipidemia    Relevant Orders    Lipid Panel with Direct LDL reflex      Other Visit Diagnoses     Welcome to Medicare preventive visit               Preventive health issues were discussed with patient, and age appropriate screening tests were ordered as noted in patient's After Visit Summary  Personalized health advice and appropriate referrals for health education or preventive services given if needed, as noted in patient's After Visit Summary  History of Present Illness:     Patient presents for a Medicare Wellness Visit      Patient Care Team:  Salo Mirza DO as PCP - General (Family Medicine)     Review of Systems:     Review of Systems   Constitutional: Negative for chills and fever  HENT: Negative for congestion and sore throat  Respiratory: Negative for chest tightness  Cardiovascular: Negative for chest pain and palpitations  Gastrointestinal: Negative for abdominal pain, constipation, diarrhea and nausea  Genitourinary: Negative for difficulty urinating  Skin: Negative  Neurological: Negative for dizziness and headaches  Psychiatric/Behavioral: Negative           Problem List:     Patient Active Problem List   Diagnosis    Type 2 diabetes mellitus without complication, with long-term current use of insulin ([address] )    Mixed hyperlipidemia    Essential hypertension    Chronic low back pain    Current use of insulin (HCC)    Personal history of diabetic foot ulcer    Diabetic polyneuropathy associated with type 2 diabetes mellitus (HCC)    Hammertoes of both feet    Corns and callosities    Bunion, left    BMI 34 0-34 9,adult    Type 2 diabetes mellitus with hyperglycemia (HCC)    Morbid obesity due to excess calories [facility])      Past Medical and Surgical History:     Past Medical History:   Diagnosis Date    Colon polyp     Diabetes mellitus ([facility] )     Hyperlipidemia     Hypertension      Past Surgical History:   Procedure Laterality Date    COLONOSCOPY      GA CORRJ HALLUX VALGUS W/SESMDC W/PROX PHLNX OSTEOT Left 10/15/2021    Procedure: BUNIONECTOMY - AKIN with staple;  Surgeon: Siobhan Pablo DPM;  Location: [facility];  Service: Podiatry    GA REPAIR OF Roselyn Crews Left 10/15/2021    Procedure: REPAIR HAMMERTOE  toes 2,3,4;  Surgeon: Siobhan Pablo DPM;  Location: [facility];  Service: Podiatry    ROTATOR CUFF REPAIR Left       Family History:     Family History   Problem Relation Age of Onset    Alzheimer's disease Mother     Stomach cancer Father     Cancer Father [de-identified]        stomach    Pancreatic cancer Brother       Social History:     Social History     Socioeconomic History    Marital status: /Civil Union     Spouse name: None    Number of children: None    Years of education: None    Highest education level: None   Occupational History    Occupation: director at [employer]       Tobacco Use    Smoking status: Former Smoker     Types: Cigarettes     Quit date: 2000     Years since quittin 7    Smokeless tobacco: Never Used    Tobacco comment: Over 21 yrs   Vaping Use    Vaping Use: Never used   Substance and Sexual Activity    Alcohol use: No    Drug use: No    Sexual activity: Yes     Partners: Female   Other Topics Concern    None   Social History Narrative    None     Social Determinants of Health     Financial Resource Strain: Low Risk     Difficulty of Paying Living Expenses: Not hard at all   Food Insecurity: Not on file   Transportation Needs: No Transportation Needs    Lack of Transportation (Medical): No    Lack of Transportation (Non-Medical):  No   Physical Activity: Not on file   Stress: Not on file   Social Connections: Not on file   Intimate Partner Violence: Not on file   Housing Stability: Not on file      Medications and Allergies:     Current Outpatient Medications   Medication Sig Dispense Refill    aspirin 325 mg tablet Take 325 mg by mouth daily      Blood Glucose Monitoring Suppl (ONETOUCH VERIO) w/Device KIT Use to test 2-3x daily 1 kit 1    celecoxib (CeleBREX) 200 mg capsule Take 200 mg by mouth daily      cyclobenzaprine (FLEXERIL) 5 mg tablet TAKE 1 TO 2 TABLETS BY MOUTH 3 TIMES A DAY AS NEEDED FOR MUSCLE SPASMS      glucose blood (OneTouch Verio) test strip Use 2-3x daily 100 each 3    Influenza Virus Vacc Split PF 0 5 ML MOR Afluria 9779-1753(PF) 45 mcg (15 mcg x 3)/0 5 mL intramuscular syringe   inject 0 5 milliliter intramuscularly      Insulin Pen Needle 32G X 6 MM MISC BD Ultra-Fine Mini Pen Needle 31 gauge x 3/16"      Invokana 300 MG TABS Take 1 tablet (300 mg total) by mouth in the morning 90 tablet 1    OneTouch Delica Lancets 36V MISC Use to test 2-3x daily 100 each 3    tamsulosin (FLOMAX) 0 4 mg Take 1 capsule (0 4 mg total) by mouth daily with dinner 90 capsule 0    cyclobenzaprine (FLEXERIL) 5 mg tablet One half to 1 tab qd prn muscle spasm 90 tablet 0    ezetimibe-simvastatin (VYTORIN) 10-20 mg per tablet TAKE 1 TABLET BY MOUTH 4 TIMES A WEEK AT 9 AM  48 tablet 3    lisinopril (ZESTRIL) 10 mg tablet TAKE 1 TABLET BY MOUTH EVERY DAY 90 tablet 1    metFORMIN (GLUCOPHAGE) 500 mg tablet Two tab by mouth with breakfast and one tab by mouth with dinner 90 tablet 0    NovoLOG Mix 70/30 FlexPen (70-30) 100 units/mL injection pen Inject 30 Units under the skin 2 (two) times a day with meals 15 mL 0     No current facility-administered medications for this visit  Allergies   Allergen Reactions    No Known Allergies       Immunizations:     Immunization History   Administered Date(s) Administered    COVID-19 MODERNA VACC 0 5 ML IM 02/12/2021, 03/12/2021, 10/25/2021, 04/01/2022    INFLUENZA 11/05/2014, 09/19/2015, 11/01/2016, 10/05/2017, 12/19/2018, 10/24/2019, 10/10/2021    Influenza Split High Dose Preservative Free IM 12/19/2018    Pneumococcal Conjugate 13-Valent 10/02/2015    Pneumococcal Polysaccharide PPV23 08/28/2018    Tdap 03/23/2016    influenza, trivalent, adjuvanted 10/04/2020      Health Maintenance:         Topic Date Due    Colorectal Cancer Screening  06/15/2023    Hepatitis C Screening  Completed         Topic Date Due    Influenza Vaccine (1) 09/01/2022      Medicare Screening Tests and Risk Assessments:     Inga Wood is here for his Welcome to Medicare visit  Health Risk Assessment:   Patient rates overall health as very good  Patient feels that their physical health rating is same  Patient is very satisfied with their life  Eyesight was rated as same  Hearing was rated as same  Patient feels that their emotional and mental health rating is much better  Patients states they are never, rarely angry  Patient states they are sometimes unusually tired/fatigued  Pain experienced in the last 7 days has been none  Patient states that he has experienced no weight loss or gain in last 6 months  Depression Screening:   PHQ-2 Score: 0      Fall Risk Screening: In the past year, patient has experienced: no history of falling in past year      Home Safety:  Patient does not have trouble with stairs inside or outside of their home  Patient has working smoke alarms and has working carbon monoxide detector  Home safety hazards include: none  Nutrition:   Current diet is Regular  Medications:   Patient is currently taking over-the-counter supplements   OTC medications include: see medication list  Patient is able to manage medications  Activities of Daily Living (ADLs)/Instrumental Activities of Daily Living (IADLs):   Walk and transfer into and out of bed and chair?: Yes  Dress and groom yourself?: Yes    Bathe or shower yourself?: Yes    Feed yourself? Yes  Do your laundry/housekeeping?: Yes  Manage your money, pay your bills and track your expenses?: Yes  Make your own meals?: Yes    Do your own shopping?: Yes    Previous Hospitalizations:   Any hospitalizations or ED visits within the last 12 months?: No      Advance Care Planning:   Living will: No    Durable POA for healthcare: No    Advanced directive counseling given: Yes    End of Life Decisions reviewed with patient: Yes      Cognitive Screening:   Provider or family/friend/caregiver concerned regarding cognition?: No    PREVENTIVE SCREENINGS      Cardiovascular Screening:    General: Screening Not Indicated and History Lipid Disorder      Diabetes Screening:     General: Screening Not Indicated and History Diabetes      Colorectal Cancer Screening:     General: Screening Current      Abdominal Aortic Aneurysm (AAA) Screening:    Risk factors include: age between 73-69 yo and tobacco use        Lung Cancer Screening:     General: Screening Not Indicated      Hepatitis C Screening:    General: Screening Current    Screening, Brief Intervention, and Referral to Treatment (SBIRT)    Screening  Typical number of drinks in a day: 0  Typical number of drinks in a week: 0  Interpretation: Low risk drinking behavior  Single Item Drug Screening:  How often have you used an illegal drug (including marijuana) or a prescription medication for non-medical reasons in the past year? never    Single Item Drug Screen Score: 0  Interpretation: Negative screen for possible drug use disorder    Brief Intervention  Alcohol & drug use screenings were reviewed  No concerns regarding substance use disorder identified       No exam data present     Physical Exam:     /68 (BP Location: Right arm, Patient Position: Sitting)   Pulse 96   Temp 97 5 °F (36 4 °C)   Resp 16   Ht 6' 6" (1 981 m)   Wt 136 kg (300 lb)   SpO2 98%   BMI 34 67 kg/m²     Physical Exam  Vitals and nursing note reviewed  Constitutional:       Appearance: He is well-developed  He is obese  HENT:      Head: Normocephalic  Eyes:      Conjunctiva/sclera: Conjunctivae normal    Cardiovascular:      Rate and Rhythm: Normal rate and regular rhythm  Heart sounds: No murmur heard  Pulmonary:      Effort: Pulmonary effort is normal  No respiratory distress  Breath sounds: Normal breath sounds  Abdominal:      Palpations: Abdomen is soft  Tenderness: There is no abdominal tenderness  Musculoskeletal:      Cervical back: Neck supple  Right lower leg: No edema  Left lower leg: No edema  Skin:     General: Skin is warm and dry  Neurological:      Mental Status: He is alert and oriented to person, place, and time     Psychiatric:         Mood and Affect: Mood normal           Nevaeh Vargas DO

## 2022-09-13 NOTE — PATIENT INSTRUCTIONS
Medicare Preventive Visit Patient Instructions  Thank you for completing your Welcome to Medicare Visit or Medicare Annual Wellness Visit today  Your next wellness visit will be due in one year (9/14/2023)  The screening/preventive services that you may require over the next 5-10 years are detailed below  Some tests may not apply to you based off risk factors and/or age  Screening tests ordered at today's visit but not completed yet may show as past due  Also, please note that scanned in results may not display below  Preventive Screenings:  Service Recommendations Previous Testing/Comments   Colorectal Cancer Screening  · Colonoscopy    · Fecal Occult Blood Test (FOBT)/Fecal Immunochemical Test (FIT)  · Fecal DNA/Cologuard Test  · Flexible Sigmoidoscopy Age: 39-70 years old   Colonoscopy: every 10 years (May be performed more frequently if at higher risk)  OR  FOBT/FIT: every 1 year  OR  Cologuard: every 3 years  OR  Sigmoidoscopy: every 5 years  Screening may be recommended earlier than age 39 if at higher risk for colorectal cancer  Also, an individualized decision between you and your healthcare provider will decide whether screening between the ages of 74-80 would be appropriate   Colonoscopy: 06/15/2020  FOBT/FIT: Not on file  Cologuard: Not on file  Sigmoidoscopy: Not on file    Screening Current     Prostate Cancer Screening Individualized decision between patient and health care provider in men between ages of 53-78   Medicare will cover every 12 months beginning on the day after your 50th birthday PSA: 0 8 ng/mL           Hepatitis C Screening Once for adults born between 1945 and 1965  More frequently in patients at high risk for Hepatitis C Hep C Antibody: 11/01/2018    Screening Current   Diabetes Screening 1-2 times per year if you're at risk for diabetes or have pre-diabetes Fasting glucose: 182 mg/dL (2/29/2020)  A1C: 7 4 % (9/7/2022)  Screening Not Indicated  History Diabetes   Cholesterol Screening Once every 5 years if you don't have a lipid disorder  May order more often based on risk factors  Lipid panel: 08/13/2020  Screening Not Indicated  History Lipid Disorder      Other Preventive Screenings Covered by Medicare:  1  Abdominal Aortic Aneurysm (AAA) Screening: covered once if your at risk  You're considered to be at risk if you have a family history of AAA or a male between the age of 73-68 who smoking at least 100 cigarettes in your lifetime  2  Lung Cancer Screening: covers low dose CT scan once per year if you meet all of the following conditions: (1) Age 50-69; (2) No signs or symptoms of lung cancer; (3) Current smoker or have quit smoking within the last 15 years; (4) You have a tobacco smoking history of at least 20 pack years (packs per day x number of years you smoked); (5) You get a written order from a healthcare provider  3  Glaucoma Screening: covered annually if you're considered high risk: (1) You have diabetes OR (2) Family history of glaucoma OR (3)  aged 48 and older OR (3)  American aged 72 and older  3  Osteoporosis Screening: covered every 2 years if you meet one of the following conditions: (1) Have a vertebral abnormality; (2) On glucocorticoid therapy for more than 3 months; (3) Have primary hyperparathyroidism; (4) On osteoporosis medications and need to assess response to drug therapy  5  HIV Screening: covered annually if you're between the age of 12-76  Also covered annually if you are younger than 13 and older than 72 with risk factors for HIV infection  For pregnant patients, it is covered up to 3 times per pregnancy      Immunizations:  Immunization Recommendations   Influenza Vaccine Annual influenza vaccination during flu season is recommended for all persons aged >= 6 months who do not have contraindications   Pneumococcal Vaccine   * Pneumococcal conjugate vaccine = PCV13 (Prevnar 13), PCV15 (Vaxneuvance), PCV20 (Prevnar 20)  * Pneumococcal polysaccharide vaccine = PPSV23 (Pneumovax) Adults 2364 years old: 1-3 doses may be recommended based on certain risk factors  Adults 72 years old: 1-2 doses may be recommended based off what pneumonia vaccine you previously received   Hepatitis B Vaccine 3 dose series if at intermediate or high risk (ex: diabetes, end stage renal disease, liver disease)   Tetanus (Td) Vaccine - COST NOT COVERED BY MEDICARE PART B Following completion of primary series, a booster dose should be given every 10 years to maintain immunity against tetanus  Td may also be given as tetanus wound prophylaxis  Tdap Vaccine - COST NOT COVERED BY MEDICARE PART B Recommended at least once for all adults  For pregnant patients, recommended with each pregnancy  Shingles Vaccine (Shingrix) - COST NOT COVERED BY MEDICARE PART B  2 shot series recommended in those aged 48 and above     Health Maintenance Due:      Topic Date Due    Colorectal Cancer Screening  06/15/2023    Hepatitis C Screening  Completed     Immunizations Due:      Topic Date Due    Influenza Vaccine (1) 09/01/2022     Advance Directives   What are advance directives? Advance directives are legal documents that state your wishes and plans for medical care  These plans are made ahead of time in case you lose your ability to make decisions for yourself  Advance directives can apply to any medical decision, such as the treatments you want, and if you want to donate organs  What are the types of advance directives? There are many types of advance directives, and each state has rules about how to use them  You may choose a combination of any of the following:  · Living will: This is a written record of the treatment you want  You can also choose which treatments you do not want, which to limit, and which to stop at a certain time  This includes surgery, medicine, IV fluid, and tube feedings  · Durable power of  for healthcare [facility]):   This is a written record that states who you want to make healthcare choices for you when you are unable to make them for yourself  This person, called a proxy, is usually a family member or a friend  You may choose more than 1 proxy  · Do not resuscitate (DNR) order:  A DNR order is used in case your heart stops beating or you stop breathing  It is a request not to have certain forms of treatment, such as CPR  A DNR order may be included in other types of advance directives  · Medical directive: This covers the care that you want if you are in a coma, near death, or unable to make decisions for yourself  You can list the treatments you want for each condition  Treatment may include pain medicine, surgery, blood transfusions, dialysis, IV or tube feedings, and a ventilator (breathing machine)  · Values history: This document has questions about your views, beliefs, and how you feel and think about life  This information can help others choose the care that you would choose  Why are advance directives important? An advance directive helps you control your care  Although spoken wishes may be used, it is better to have your wishes written down  Spoken wishes can be misunderstood, or not followed  Treatments may be given even if you do not want them  An advance directive may make it easier for your family to make difficult choices about your care  Weight Management   Why it is important to manage your weight:  Being overweight increases your risk of health conditions such as heart disease, high blood pressure, type 2 diabetes, and certain types of cancer  It can also increase your risk for osteoarthritis, sleep apnea, and other respiratory problems  Aim for a slow, steady weight loss  Even a small amount of weight loss can lower your risk of health problems  How to lose weight safely:  A safe and healthy way to lose weight is to eat fewer calories and get regular exercise   You can lose up about 1 pound a week by decreasing the number of calories you eat by 500 calories each day  Healthy meal plan for weight management:  A healthy meal plan includes a variety of foods, contains fewer calories, and helps you stay healthy  A healthy meal plan includes the following:  · Eat whole-grain foods more often  A healthy meal plan should contain fiber  Fiber is the part of grains, fruits, and vegetables that is not broken down by your body  Whole-grain foods are healthy and provide extra fiber in your diet  Some examples of whole-grain foods are whole-wheat breads and pastas, oatmeal, brown rice, and bulgur  · Eat a variety of vegetables every day  Include dark, leafy greens such as spinach, kale, suellen greens, and mustard greens  Eat yellow and orange vegetables such as carrots, sweet potatoes, and winter squash  · Eat a variety of fruits every day  Choose fresh or canned fruit (canned in its own juice or light syrup) instead of juice  Fruit juice has very little or no fiber  · Eat low-fat dairy foods  Drink fat-free (skim) milk or 1% milk  Eat fat-free yogurt and low-fat cottage cheese  Try low-fat cheeses such as mozzarella and other reduced-fat cheeses  · Choose meat and other protein foods that are low in fat  Choose beans or other legumes such as split peas or lentils  Choose fish, skinless poultry (chicken or turkey), or lean cuts of red meat (beef or pork)  Before you cook meat or poultry, cut off any visible fat  · Use less fat and oil  Try baking foods instead of frying them  Add less fat, such as margarine, sour cream, regular salad dressing and mayonnaise to foods  Eat fewer high-fat foods  Some examples of high-fat foods include french fries, doughnuts, ice cream, and cakes  · Eat fewer sweets  Limit foods and drinks that are high in sugar  This includes candy, cookies, regular soda, and sweetened drinks  Exercise:  Exercise at least 30 minutes per day on most days of the week   Some examples of exercise include walking, biking, dancing, and swimming  You can also fit in more physical activity by taking the stairs instead of the elevator or parking farther away from stores  Ask your healthcare provider about the best exercise plan for you  © Copyright [employer] 2018 Information is for End User's use only and may not be sold, redistributed or otherwise used for commercial purposes  All illustrations and images included in CareNotes® are the copyrighted property of [employer]  or [facility] Linda Brizuela   Type 2 Diabetes Management for Adults   AMBULATORY CARE:   Type 2 diabetes  is a disease that affects how your body uses glucose (sugar)  Either your body cannot make enough insulin, or it cannot use the insulin correctly  It is important to keep diabetes controlled to prevent damage to your heart, blood vessels, and other organs  Have someone call your local emergency number (911 in the [address] [facility]) if:   · You cannot be woken  · You have signs of diabetic ketoacidosis:     ? confusion, fatigue    ? vomiting    ? rapid heartbeat    ? fruity smelling breath    ? extreme thirst    ? dry mouth and skin    · You have any of the following signs of a heart attack:      ? Squeezing, pressure, or pain in your chest    ? You may  also have any of the following:     § Discomfort or pain in your back, neck, jaw, stomach, or arm    § Shortness of breath    § Nausea or vomiting    § Lightheadedness or a sudden cold sweat    · You have any of the following signs of a stroke:      ? Numbness or drooping on one side of your face     ? Weakness in an arm or leg    ? Confusion or difficulty speaking    ? Dizziness, a severe headache, or vision loss    Call your doctor or diabetes care team if:   · You have a sore or wound that will not heal     · You have a change in the amount you urinate  · Your blood sugar levels are higher than your target goals      · You often have lower blood sugar levels than your target goals     · Your skin is red, dry, warm, or swollen  · You have trouble coping with diabetes, or you feel anxious or depressed  · You have questions or concerns about your condition or care  What you need to know about high blood sugar levels:  High blood sugar levels may not cause any symptoms  You may feel more thirsty or urinate more often than usual  Over time, high blood sugar levels can damage your nerves, blood vessels, tissues, and organs  The following can increase your blood sugar levels:  · Large meals or large amounts of carbohydrates at one time    · Less physical activity    · Stress    · Illness    · A lower dose of medicine or insulin, or a late dose    What you need to know about low blood sugar levels: You can prevent symptoms such as shakiness, dizziness, irritability, or confusion by preventing your blood sugar levels from going too low  · Treat a low blood sugar level right away  ? Drink 4 ounces of juice or have 1 tube of glucose gel  ? Check your blood sugar level again 10 to 15 minutes later  ? When the level goes back to normal, eat a meal or snack to prevent another decrease  · Keep glucose gel, raisins, or hard candy with you at all times to treat a low blood sugar level  · Your blood sugar level can get too low if you take diabetes medicine or insulin and do not eat enough food  · If you use insulin, check your blood sugar level before you exercise  ? If your blood sugar level is below 100 mg/dL, eat 4 crackers or 2 ounces of raisins, or drink 4 ounces of juice  ? Check your level every 30 minutes if you exercise more than 1 hour  ? You may need a snack during or after exercise  What you can do to manage your blood sugar levels:   · Check your blood sugar levels as directed and as needed  Several items are available to use to check your levels  You may need to check by testing a drop of blood in a glucose monitor   You may instead be given a continuous glucose monitoring (CGM) device  The device is worn at all times  The CGM checks your blood sugar level every 5 minutes  It sends results to an electronic device such as a smart phone  A CGM can be used with or without an insulin pump  Talk with your provider to find out which method is best for you  The goal for blood sugar levels before meals  is between 80 and 130 mg/dL and 2 hours after eating  is lower than 180 mg/dL  · Make healthy food choices  Work with a dietitian to develop a meal plan that works for you and your schedule  A dietitian can help you learn how to eat the right amount of carbohydrates during your meals and snacks  Carbohydrates can raise your blood sugar level if you eat too many at one time  Examples of foods that contain carbohydrates are breads, cereals, rice, pasta, and sweets  · Get regular physical activity  Physical activity can help you get to your target blood sugar level goal and manage your weight  Get at least 150 minutes of moderate to vigorous aerobic physical activity each week  Do not miss more than 2 days in a row  Do not sit longer than 30 minutes at a time  Your healthcare provider can help you create an activity plan  The plan can include the best activities for you and can help you build your strength and endurance  · Maintain a healthy weight  Ask your healthcare provider what a healthy weight is for you  Ask him or her to help you create a safe weight loss plan if you are overweight  · Take your diabetes medicine or insulin as directed  You may need diabetes medicine, insulin, or both to help control your blood sugar levels  Your healthcare provider will teach you how and when to take your diabetes medicine or insulin  You will also be taught about side effects oral diabetes medicine can cause  Insulin may be injected, or given through a pump or pen   You and your care team will discuss which method is best for you     ? An insulin pump  is an implanted device that gives your insulin 24 hours a day  An insulin pump prevents the need for multiple insulin injections in a day  ? An insulin pen  is a device prefilled with the right amount of insulin  ? You and your family members will be taught how to draw up and give insulin  if this is the best method for you  Your education team will also teach you how to dispose of needles and syringes  ? You will learn how much insulin you need  and when to give it  You will be taught when not to give insulin  You will also be taught what to do if your blood sugar level drops too low  This may happen if you take insulin and do not eat the right amount of carbohydrates  Other things you can do to manage type 2 diabetes:   · Wear medical alert identification  Wear medical alert jewelry or carry a card that says you have diabetes  Ask your provider where to get these items  · Do not smoke  Nicotine and other chemicals in cigarettes and cigars can cause lung and blood vessel damage  It also makes it more difficult to manage your diabetes  Ask your provider for information if you currently smoke and need help to quit  Do not use e-cigarettes or smokeless tobacco in place of cigarettes or to help you quit  They still contain nicotine  · Check your feet each day for cuts, scratches, calluses, or other wounds  Look for redness and swelling, and feel for warmth  Wear shoes that fit well  Check your shoes for rocks or other objects that can hurt your feet  Do not walk barefoot or wear shoes without socks  Wear cotton socks to help keep your feet dry  · Ask about vaccines you may need  You have a higher risk for serious illness if you get the flu, pneumonia, COVID-19, or hepatitis  Ask your provider if you should get vaccines to prevent these or other diseases, and when to get the vaccines      · Talk to your care team if you become stressed about diabetes care   Sometimes being able to fit diabetes care into your life can cause increased stress  The stress can cause you not to take care of yourself properly  Your care team can help by offering tips about self-care  Your care team may suggest you talk to a mental health provider  The provider can listen and offer help with self-care issues  Follow up with your doctor or diabetes care team as directed: You may need to have blood tests done before your follow-up visit  The test results will show if changes need to be made in your treatment or self-care  Write down your questions so you remember to ask them during your visits  Talk to your provider if you cannot afford your medicine  © Copyright [employer] 2022 Information is for End User's use only and may not be sold, redistributed or otherwise used for commercial purposes  All illustrations and images included in CareNotes® are the copyrighted property of [employer]  or Chris Brizeula   The above information is an  only  It is not intended as medical advice for individual conditions or treatments  Talk to your doctor, nurse or pharmacist before following any medical regimen to see if it is safe and effective for you

## 2022-09-20 ENCOUNTER — HOSPITAL ENCOUNTER (OUTPATIENT)
Dept: RADIOLOGY | Facility: HOSPITAL | Age: 67
Discharge: HOME | End: 2022-09-20
Payer: MEDICARE

## 2022-09-20 ENCOUNTER — HOSPITAL ENCOUNTER (OUTPATIENT)
Dept: NON INVASIVE DIAGNOSTICS | Facility: HOSPITAL | Age: 67
Discharge: HOME | End: 2022-09-20
Payer: MEDICARE

## 2022-09-20 DIAGNOSIS — R07.9 CHEST PAIN, UNSPECIFIED TYPE: ICD-10-CM

## 2022-09-20 LAB
BASELINE ST DEPRESSION: 0 MM
MAX HR PERCENT: 86 %
MAX HR: 131 BPM
NUC STRESS EJECTION FRACTION: 55 %
RATE PRESSURE PRODUCT: NORMAL
SL CV REST NUCLEAR ISOTOPE DOSE: 16 MCI
SL CV STRESS NUCLEAR ISOTOPE DOSE: 47 MCI
SL CV STRESS RECOVERY BP: NORMAL MMHG
SL CV STRESS RECOVERY HR: 104 BPM
SL CV STRESS RECOVERY O2 SAT: 99 %
STRESS ANGINA INDEX: 0
STRESS BASELINE BP: NORMAL MMHG
STRESS BASELINE HR: 82 BPM
STRESS DUKE TREADMILL SCORE: 3
STRESS O2 SAT REST: 99 %
STRESS PEAK HR: 129 BPM
STRESS POST ESTIMATED WORKLOAD: 1.7 METS
STRESS POST EXERCISE DUR MIN: 3 MIN
STRESS POST EXERCISE DUR SEC: 0 SEC
STRESS POST O2 SAT PEAK: 99 %
STRESS POST PEAK BP: 146 MMHG
STRESS ST DEPRESSION: 0 MM
STRESS/REST PERFUSION RATIO: 1

## 2022-09-20 PROCEDURE — 78452 HT MUSCLE IMAGE SPECT MULT: CPT | Performed by: INTERNAL MEDICINE

## 2022-09-20 PROCEDURE — A9502 TC99M TETROFOSMIN: HCPCS

## 2022-09-20 PROCEDURE — 93017 CV STRESS TEST TRACING ONLY: CPT

## 2022-09-20 PROCEDURE — 93016 CV STRESS TEST SUPVJ ONLY: CPT | Performed by: INTERNAL MEDICINE

## 2022-09-20 PROCEDURE — 78452 HT MUSCLE IMAGE SPECT MULT: CPT

## 2022-09-20 PROCEDURE — 93018 CV STRESS TEST I&R ONLY: CPT | Performed by: INTERNAL MEDICINE

## 2022-09-20 PROCEDURE — G1004 CDSM NDSC: HCPCS

## 2022-09-20 RX ADMIN — REGADENOSON 0.4 MG: 0.08 INJECTION, SOLUTION INTRAVENOUS at 09:38

## 2022-09-21 DIAGNOSIS — I10 ESSENTIAL HYPERTENSION: ICD-10-CM

## 2022-09-22 DIAGNOSIS — N40.0 BENIGN PROSTATIC HYPERPLASIA WITHOUT LOWER URINARY TRACT SYMPTOMS: ICD-10-CM

## 2022-09-22 RX ORDER — TAMSULOSIN HYDROCHLORIDE 0.4 MG/1
0.4 CAPSULE ORAL
Qty: 90 CAPSULE | Refills: 0 | Status: SHIPPED | OUTPATIENT
Start: 2022-09-22

## 2022-09-22 RX ORDER — LISINOPRIL 10 MG/1
TABLET ORAL
Qty: 90 TABLET | Refills: 1 | Status: SHIPPED | OUTPATIENT
Start: 2022-09-22

## 2023-01-03 DIAGNOSIS — Z79.4 TYPE 2 DIABETES MELLITUS WITHOUT COMPLICATION, WITH LONG-TERM CURRENT USE OF INSULIN (HCC): ICD-10-CM

## 2023-01-03 DIAGNOSIS — E11.9 TYPE 2 DIABETES MELLITUS WITHOUT COMPLICATION, WITH LONG-TERM CURRENT USE OF INSULIN (HCC): ICD-10-CM

## 2023-01-04 RX ORDER — INSULIN ASPART 100 [IU]/ML
30 INJECTION, SUSPENSION SUBCUTANEOUS 2 TIMES DAILY WITH MEALS
Qty: 30 ML | Refills: 1 | Status: SHIPPED | OUTPATIENT
Start: 2023-01-04

## 2023-02-13 LAB — HBA1C MFR BLD HPLC: 7.1 %

## 2023-08-09 ENCOUNTER — TELEPHONE (OUTPATIENT)
Dept: FAMILY MEDICINE CLINIC | Facility: CLINIC | Age: 68
End: 2023-08-09

## 2023-08-25 NOTE — TELEPHONE ENCOUNTER
08/25/23 5:57 PM     The office's request has been received, reviewed, and the patient chart updated. The PCP has successfully been removed with a patient attribution note. This message will now be completed.     Thank you  Jamee Cortes

## (undated) DEVICE — BETHLEHEM UNIVERSAL  MIONR EXT: Brand: CARDINAL HEALTH

## (undated) DEVICE — CURITY NON-ADHERENT STRIPS: Brand: CURITY

## (undated) DEVICE — SCD SEQUENTIAL COMPRESSION COMFORT SLEEVE MEDIUM KNEE LENGTH: Brand: KENDALL SCD

## (undated) DEVICE — STRETCH BANDAGE: Brand: CURITY

## (undated) DEVICE — BLADE SAGITTAL 25.6 X 9.5MM

## (undated) DEVICE — NEEDLE 18 G X 1 1/2

## (undated) DEVICE — GLOVE INDICATOR PI UNDERGLOVE SZ 7.5 BLUE

## (undated) DEVICE — Device

## (undated) DEVICE — SUT VICRYL 3-0 PS-2 27 IN J427H

## (undated) DEVICE — CURITY STRETCH BANDAGE: Brand: CURITY

## (undated) DEVICE — CHLORAPREP HI-LITE 26ML ORANGE

## (undated) DEVICE — INTENDED FOR TISSUE SEPARATION, AND OTHER PROCEDURES THAT REQUIRE A SHARP SURGICAL BLADE TO PUNCTURE OR CUT.: Brand: BARD-PARKER ® CARBON RIB-BACK BLADES

## (undated) DEVICE — SUT VICRYL 4-0 PS-2 27 IN J426H

## (undated) DEVICE — DRESSING GUAZE ADH BORDER 4 X 4 IN

## (undated) DEVICE — 10FR FRAZIER SUCTION HANDLE: Brand: CARDINAL HEALTH

## (undated) DEVICE — CAST PADDING 4 IN SYNTHETIC NON-STRL

## (undated) DEVICE — SYRINGE 10ML LL

## (undated) DEVICE — GLOVE SRG BIOGEL 7.5

## (undated) DEVICE — NEEDLE 25G X 1 1/2

## (undated) DEVICE — 2000CC GUARDIAN II: Brand: GUARDIAN

## (undated) DEVICE — SUT ETHILON 4-0 PS-2 18 IN 1667H

## (undated) DEVICE — PLUMEPEN PRO 10FT

## (undated) DEVICE — OCCLUSIVE GAUZE STRIP,3% BISMUTH TRIBROMOPHENATE IN PETROLATUM BLEND: Brand: XEROFORM

## (undated) DEVICE — PREP PAD BNS: Brand: CONVERTORS

## (undated) DEVICE — ACE WRAP 4 IN UNSTERILE